# Patient Record
Sex: FEMALE | Race: WHITE | Employment: UNEMPLOYED | URBAN - METROPOLITAN AREA
[De-identification: names, ages, dates, MRNs, and addresses within clinical notes are randomized per-mention and may not be internally consistent; named-entity substitution may affect disease eponyms.]

---

## 2018-05-22 ENCOUNTER — TELEPHONE (OUTPATIENT)
Dept: INTERNAL MEDICINE | Age: 33
End: 2018-05-22

## 2018-06-01 ENCOUNTER — ANTI-COAG VISIT (OUTPATIENT)
Dept: INTERNAL MEDICINE | Age: 33
End: 2018-06-01

## 2019-01-24 PROBLEM — R11.0 NAUSEA: Status: ACTIVE | Noted: 2019-01-24

## 2019-02-05 PROBLEM — Z87.898 HISTORY OF INTRAVENOUS DRUG USE IN REMISSION: Status: ACTIVE | Noted: 2019-02-05

## 2019-02-05 PROBLEM — Z79.01 ANTICOAGULATED ON COUMADIN: Status: ACTIVE | Noted: 2019-02-05

## 2019-02-05 PROBLEM — F19.90 SUBSTANCE USE DISORDER: Status: ACTIVE | Noted: 2019-02-05

## 2019-02-05 PROBLEM — Z91.148 H/O MEDICATION NONCOMPLIANCE: Status: ACTIVE | Noted: 2019-02-05

## 2019-02-05 PROBLEM — Z91.14 H/O MEDICATION NONCOMPLIANCE: Status: ACTIVE | Noted: 2019-02-05

## 2019-02-05 PROBLEM — A54.9 NEISSERIA GONORRHEAE: Status: ACTIVE | Noted: 2019-02-05

## 2019-02-05 PROBLEM — Z72.51 UNPROTECTED SEXUAL INTERCOURSE: Status: ACTIVE | Noted: 2019-02-05

## 2019-02-05 PROBLEM — F19.91 HISTORY OF INTRAVENOUS DRUG USE IN REMISSION: Status: ACTIVE | Noted: 2019-02-05

## 2019-02-05 PROBLEM — B18.2 CHRONIC HEPATITIS C WITHOUT HEPATIC COMA (HCC): Status: ACTIVE | Noted: 2019-02-05

## 2019-02-05 PROBLEM — N76.0 BACTERIAL VAGINOSIS: Status: ACTIVE | Noted: 2019-02-05

## 2019-02-05 PROBLEM — B37.31 VAGINAL CANDIDIASIS: Status: ACTIVE | Noted: 2019-02-05

## 2019-02-05 PROBLEM — B96.89 BACTERIAL VAGINOSIS: Status: ACTIVE | Noted: 2019-02-05

## 2019-04-13 PROBLEM — Z72.51 UNPROTECTED SEXUAL INTERCOURSE: Status: RESOLVED | Noted: 2019-02-05 | Resolved: 2019-04-13

## 2019-04-13 PROBLEM — Z86.19 H/O GONORRHEA: Status: ACTIVE | Noted: 2019-04-13

## 2019-04-13 PROBLEM — B37.31 VAGINAL CANDIDIASIS: Status: RESOLVED | Noted: 2019-02-05 | Resolved: 2019-04-13

## 2019-04-13 PROBLEM — Z87.81: Status: ACTIVE | Noted: 2019-04-13

## 2019-05-07 PROBLEM — R74.8 ELEVATED LIVER ENZYMES: Status: ACTIVE | Noted: 2019-05-07

## 2019-06-16 PROBLEM — B15.9 VIRAL HEPATITIS A WITHOUT HEPATIC COMA: Status: ACTIVE | Noted: 2019-06-16

## 2019-06-29 PROBLEM — K64.5 THROMBOSED HEMORRHOIDS: Status: ACTIVE | Noted: 2019-06-29

## 2019-10-14 PROBLEM — R74.8 ELEVATED LIVER ENZYMES: Status: RESOLVED | Noted: 2019-05-07 | Resolved: 2019-10-14

## 2019-10-14 PROBLEM — R09.81 NASAL CONGESTION: Status: ACTIVE | Noted: 2019-10-14

## 2020-02-03 PROBLEM — S06.9XAA TRAUMATIC BRAIN INJURY: Status: ACTIVE | Noted: 2020-02-03

## 2020-02-03 PROBLEM — B15.9 VIRAL HEPATITIS A WITHOUT HEPATIC COMA: Status: RESOLVED | Noted: 2019-06-16 | Resolved: 2020-02-03

## 2020-02-03 PROBLEM — F12.90 MARIJUANA USE: Status: ACTIVE | Noted: 2020-02-03

## 2020-02-03 PROBLEM — R09.81 NASAL CONGESTION: Status: RESOLVED | Noted: 2019-10-14 | Resolved: 2020-02-03

## 2020-02-03 PROBLEM — A54.9 NEISSERIA GONORRHEAE: Status: RESOLVED | Noted: 2019-02-05 | Resolved: 2020-02-03

## 2020-02-03 PROBLEM — K64.5 THROMBOSED HEMORRHOIDS: Status: RESOLVED | Noted: 2019-06-29 | Resolved: 2020-02-03

## 2020-05-26 PROBLEM — K64.9 HEMORRHOIDS: Status: ACTIVE | Noted: 2020-05-26

## 2020-06-01 PROBLEM — K64.9 HEMORRHOIDS: Status: RESOLVED | Noted: 2020-05-26 | Resolved: 2020-06-01

## 2020-06-01 PROBLEM — Z98.890 H/O HEMORRHOIDECTOMY: Status: ACTIVE | Noted: 2020-06-01

## 2020-06-01 PROBLEM — K64.5 THROMBOSED EXTERNAL HEMORRHOID: Status: RESOLVED | Noted: 2019-06-29 | Resolved: 2020-06-01

## 2020-06-29 PROBLEM — K59.09 OTHER CONSTIPATION: Status: ACTIVE | Noted: 2020-06-29

## 2020-06-30 PROBLEM — L65.9 HAIR THINNING: Status: ACTIVE | Noted: 2020-06-30

## 2020-06-30 PROBLEM — R60.0 LOCALIZED EDEMA: Status: ACTIVE | Noted: 2020-06-30

## 2020-08-12 PROBLEM — Z86.19 HISTORY OF HEPATITIS A: Status: ACTIVE | Noted: 2020-08-12

## 2021-03-10 PROBLEM — K21.9 GASTROESOPHAGEAL REFLUX DISEASE WITHOUT ESOPHAGITIS: Status: ACTIVE | Noted: 2021-03-10

## 2021-03-11 PROBLEM — F43.0 ACUTE CRISIS REACTION: Status: ACTIVE | Noted: 2021-03-11

## 2022-06-06 ENCOUNTER — APPOINTMENT (OUTPATIENT)
Dept: GENERAL RADIOLOGY | Age: 37
End: 2022-06-06
Payer: COMMERCIAL

## 2022-06-06 ENCOUNTER — HOSPITAL ENCOUNTER (EMERGENCY)
Age: 37
Discharge: HOME | End: 2022-06-06
Attending: EMERGENCY MEDICINE
Payer: COMMERCIAL

## 2022-06-06 ENCOUNTER — APPOINTMENT (OUTPATIENT)
Dept: ULTRASOUND IMAGING | Age: 37
End: 2022-06-06
Payer: COMMERCIAL

## 2022-06-06 VITALS
RESPIRATION RATE: 16 BRPM | BODY MASS INDEX: 30.43 KG/M2 | HEIGHT: 60 IN | SYSTOLIC BLOOD PRESSURE: 120 MMHG | DIASTOLIC BLOOD PRESSURE: 84 MMHG | TEMPERATURE: 98.1 F | WEIGHT: 155 LBS | HEART RATE: 90 BPM | OXYGEN SATURATION: 99 %

## 2022-06-06 DIAGNOSIS — S93.402A SPRAIN OF LEFT ANKLE, UNSPECIFIED LIGAMENT, INITIAL ENCOUNTER: Primary | ICD-10-CM

## 2022-06-06 PROCEDURE — 99284 EMERGENCY DEPT VISIT MOD MDM: CPT

## 2022-06-06 PROCEDURE — 73610 X-RAY EXAM OF ANKLE: CPT

## 2022-06-06 PROCEDURE — 93971 EXTREMITY STUDY: CPT

## 2022-06-06 RX ORDER — CHOLECALCIFEROL (VITAMIN D3) 1250 MCG
CAPSULE ORAL
COMMUNITY
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
## 2022-06-06 NOTE — ED NOTES
Ace bandage applied.   Patient discharged to home states understanding of home instructions      Hever Muhammad RN  06/06/22 1466

## 2022-07-14 PROBLEM — F31.81 BIPOLAR 2 DISORDER (HCC): Status: ACTIVE | Noted: 2022-07-14

## 2022-09-29 ENCOUNTER — OFFICE VISIT (OUTPATIENT)
Dept: PRIMARY CARE CLINIC | Age: 37
End: 2022-09-29
Payer: COMMERCIAL

## 2022-09-29 VITALS — DIASTOLIC BLOOD PRESSURE: 71 MMHG | HEART RATE: 76 BPM | SYSTOLIC BLOOD PRESSURE: 101 MMHG | TEMPERATURE: 97.7 F

## 2022-09-29 DIAGNOSIS — J01.90 ACUTE BACTERIAL SINUSITIS: Primary | ICD-10-CM

## 2022-09-29 DIAGNOSIS — B96.89 ACUTE BACTERIAL SINUSITIS: Primary | ICD-10-CM

## 2022-09-29 LAB
Lab: NORMAL
PERFORMING INSTRUMENT: NORMAL
QC PASS/FAIL: NORMAL
SARS-COV-2, POC: NORMAL

## 2022-09-29 PROCEDURE — G8427 DOCREV CUR MEDS BY ELIG CLIN: HCPCS | Performed by: NURSE PRACTITIONER

## 2022-09-29 PROCEDURE — 99213 OFFICE O/P EST LOW 20 MIN: CPT | Performed by: NURSE PRACTITIONER

## 2022-09-29 PROCEDURE — 87426 SARSCOV CORONAVIRUS AG IA: CPT | Performed by: NURSE PRACTITIONER

## 2022-09-29 PROCEDURE — 4004F PT TOBACCO SCREEN RCVD TLK: CPT | Performed by: NURSE PRACTITIONER

## 2022-09-29 PROCEDURE — G8417 CALC BMI ABV UP PARAM F/U: HCPCS | Performed by: NURSE PRACTITIONER

## 2022-09-29 RX ORDER — AZELASTINE 1 MG/ML
1 SPRAY, METERED NASAL 2 TIMES DAILY
Qty: 30 ML | Refills: 0 | Status: SHIPPED | OUTPATIENT
Start: 2022-09-29

## 2022-09-29 RX ORDER — DOXYCYCLINE HYCLATE 100 MG
100 TABLET ORAL 2 TIMES DAILY
Qty: 20 TABLET | Refills: 0 | Status: SHIPPED | OUTPATIENT
Start: 2022-09-29 | End: 2022-10-09
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
## 2022-10-17 ENCOUNTER — TELEPHONE (OUTPATIENT)
Dept: ORTHOPEDIC SURGERY | Age: 37
End: 2022-10-17

## 2022-10-17 NOTE — TELEPHONE ENCOUNTER
Patient last saw Dr Adonis Stone 2017. She is asking to schedule with him for her left ankle pain. Please contact patient to schedule as no soon appointments are available to offer.

## 2022-10-20 NOTE — TELEPHONE ENCOUNTER
Call to pt she will get a referral from her PCP and have them fax to our office. She already contacted [facility] to fax CT report . She will request a CD.
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
## 2022-10-20 NOTE — TELEPHONE ENCOUNTER
We will need a referral. Can she inform us who ordered CT. Please have them fax CT results and will need imaging on CD. Will need to review with TTS regarding follow up.   Electronically signed by Ger Kaur PA-C on 10/20/2022 at 1:14 PM

## 2022-10-21 NOTE — TELEPHONE ENCOUNTER
Call to pt lvm per provider appt scheduled   Future Appointments   Date Time Provider Garland Pinto   12/7/2022  9:00 AM oJe Russo MD [facility]

## 2022-10-21 NOTE — TELEPHONE ENCOUNTER
Reviewed with TTS, ok to see next available.   Electronically signed by Iraida Li PA-C on 10/21/2022 at 9:47 AM

## 2022-11-06 ENCOUNTER — APPOINTMENT (OUTPATIENT)
Dept: GENERAL RADIOLOGY | Age: 37
End: 2022-11-06
Payer: COMMERCIAL

## 2022-11-06 ENCOUNTER — HOSPITAL ENCOUNTER (EMERGENCY)
Age: 37
Discharge: HOME | End: 2022-11-06
Payer: COMMERCIAL

## 2022-11-06 VITALS
WEIGHT: 150 LBS | SYSTOLIC BLOOD PRESSURE: 123 MMHG | DIASTOLIC BLOOD PRESSURE: 88 MMHG | RESPIRATION RATE: 16 BRPM | HEIGHT: 60 IN | BODY MASS INDEX: 29.45 KG/M2 | TEMPERATURE: 98.1 F | OXYGEN SATURATION: 100 % | HEART RATE: 63 BPM

## 2022-11-06 DIAGNOSIS — M79.672 LEFT FOOT PAIN: Primary | ICD-10-CM

## 2022-11-06 PROCEDURE — 99283 EMERGENCY DEPT VISIT LOW MDM: CPT

## 2022-11-06 PROCEDURE — 73610 X-RAY EXAM OF ANKLE: CPT

## 2022-11-06 PROCEDURE — 6370000000 HC RX 637 (ALT 250 FOR IP): Performed by: NURSE PRACTITIONER

## 2022-11-06 PROCEDURE — 73630 X-RAY EXAM OF FOOT: CPT

## 2022-11-06 RX ORDER — ACETAMINOPHEN 325 MG/1
650 TABLET ORAL ONCE
Status: COMPLETED | OUTPATIENT
Start: 2022-11-06 | End: 2022-11-06

## 2022-11-06 RX ADMIN — ACETAMINOPHEN 650 MG: 325 TABLET ORAL at 14:26
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
## 2022-11-08 NOTE — TELEPHONE ENCOUNTER
Patient was seen at ED on 11/6 for left foot pain and advised she needs to follow up with Dr. James Garcia sooner than her scheduled appointment . Please advise for ED follow up.

## 2022-11-08 NOTE — TELEPHONE ENCOUNTER
11/6/2022 ED TTS on call  Chief Complaint:  Foot Pain (Left foot pain, unsure of injury. Been on feet a lot more than normal. Hx of titanium all throughout legs. )  Imaging: All Radiology results interpreted by Radiologist unless otherwise noted. XR FOOT LEFT (MIN 3 VIEWS)   Final Result   1. Extensive chronic posttraumatic and postsurgical changes to the distal   left tibia and fibula. No obvious complications. 2.  Persistent os effect fragments posterior to the distal left tibia and   adjacent to the distal left fibula. Lateral malleolar soft tissue swelling. 3.  Tibiotalar joint degenerative changes. 4.  Tiny anterior calcaneal enthesophyte. Remaining imaged left foot is   unremarkable. Normal overall alignment. Slight disuse osteopenia may be   present. RECOMMENDATION:   In the setting of recent trauma, if there is persistent symptoms and physical   exam warrants a repeat radiograph in 10-14 days could be considered as occult   fractures may not be evident on initial imaging evaluation. XR ANKLE LEFT (MIN 3 VIEWS)   Final Result   1. Extensive chronic posttraumatic and postsurgical changes to the distal   left tibia and fibula. No obvious complications. 2.  Persistent os effect fragments posterior to the distal left tibia and   adjacent to the distal left fibula. Lateral malleolar soft tissue swelling. 3.  Tibiotalar joint degenerative changes. 4.  Tiny anterior calcaneal enthesophyte. Remaining imaged left foot is   unremarkable. Normal overall alignment. Slight disuse osteopenia may be   present. RECOMMENDATION:   In the setting of recent trauma, if there is persistent symptoms and physical   exam warrants a repeat radiograph in 10-14 days could be considered as occult   fractures may not be evident on initial imaging evaluation. Routing to provider for scheduling rec.

## 2022-11-08 NOTE — TELEPHONE ENCOUNTER
Call to pt advised: Please place patient on cancellation list for sooner appt. Patients imaging reviewed, no new fracture appreciated, compared to xrays completed in June. Pt verbalized understanding and is in agreement with the plan.

## 2022-11-08 NOTE — TELEPHONE ENCOUNTER
Please place patient on cancellation list for sooner appt. Patients imaging reviewed, no new fracture appreciated, compared to xrays completed in June.  Reviewed with TTS  Electronically signed by Iraida Li PA-C on 11/8/2022 at 12:57 PM

## 2022-12-02 DIAGNOSIS — M25.572 LEFT ANKLE PAIN, UNSPECIFIED CHRONICITY: Primary | ICD-10-CM

## 2022-12-07 ENCOUNTER — OFFICE VISIT (OUTPATIENT)
Dept: ORTHOPEDIC SURGERY | Age: 37
End: 2022-12-07
Payer: COMMERCIAL

## 2022-12-07 VITALS — HEIGHT: 60 IN | BODY MASS INDEX: 29.45 KG/M2 | WEIGHT: 150 LBS

## 2022-12-07 DIAGNOSIS — M25.572 LEFT ANKLE PAIN, UNSPECIFIED CHRONICITY: Primary | ICD-10-CM

## 2022-12-07 PROCEDURE — 99202 OFFICE O/P NEW SF 15 MIN: CPT

## 2022-12-07 PROCEDURE — 99203 OFFICE O/P NEW LOW 30 MIN: CPT | Performed by: ORTHOPAEDIC SURGERY

## 2022-12-07 PROCEDURE — G8484 FLU IMMUNIZE NO ADMIN: HCPCS | Performed by: ORTHOPAEDIC SURGERY

## 2022-12-07 PROCEDURE — 4004F PT TOBACCO SCREEN RCVD TLK: CPT | Performed by: ORTHOPAEDIC SURGERY

## 2022-12-07 PROCEDURE — G8427 DOCREV CUR MEDS BY ELIG CLIN: HCPCS | Performed by: ORTHOPAEDIC SURGERY

## 2022-12-07 PROCEDURE — G8417 CALC BMI ABV UP PARAM F/U: HCPCS | Performed by: ORTHOPAEDIC SURGERY
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
## 2022-12-13 RX ORDER — LIDOCAINE 4 G/G
1 PATCH TOPICAL DAILY
Qty: 30 PATCH | Refills: 0 | Status: SHIPPED | OUTPATIENT
Start: 2022-12-13 | End: 2023-01-12

## 2022-12-13 NOTE — TELEPHONE ENCOUNTER
Patient message requesting to have lidocaine patches ordered for ankle. Patient states that she has been buying them OTC but she states insurance should cover. LOV: 12/7/22    No future appointments.     Pend and routed

## 2023-01-06 ENCOUNTER — OFFICE VISIT (OUTPATIENT)
Dept: PRIMARY CARE CLINIC | Age: 38
End: 2023-01-06
Payer: COMMERCIAL

## 2023-01-06 VITALS
RESPIRATION RATE: 19 BRPM | OXYGEN SATURATION: 96 % | WEIGHT: 155 LBS | DIASTOLIC BLOOD PRESSURE: 67 MMHG | BODY MASS INDEX: 30.43 KG/M2 | HEART RATE: 64 BPM | HEIGHT: 60 IN | SYSTOLIC BLOOD PRESSURE: 96 MMHG | TEMPERATURE: 98 F

## 2023-01-06 DIAGNOSIS — B96.89 ACUTE BACTERIAL SINUSITIS: ICD-10-CM

## 2023-01-06 DIAGNOSIS — R05.9 COUGH, UNSPECIFIED TYPE: ICD-10-CM

## 2023-01-06 DIAGNOSIS — J01.90 ACUTE BACTERIAL SINUSITIS: ICD-10-CM

## 2023-01-06 LAB
INFLUENZA A ANTIBODY: NEGATIVE
INFLUENZA B ANTIBODY: NEGATIVE
Lab: NORMAL
PERFORMING INSTRUMENT: NORMAL
QC PASS/FAIL: NORMAL
SARS-COV-2, POC: NORMAL

## 2023-01-06 PROCEDURE — G8417 CALC BMI ABV UP PARAM F/U: HCPCS | Performed by: NURSE PRACTITIONER

## 2023-01-06 PROCEDURE — G8427 DOCREV CUR MEDS BY ELIG CLIN: HCPCS | Performed by: NURSE PRACTITIONER

## 2023-01-06 PROCEDURE — 99213 OFFICE O/P EST LOW 20 MIN: CPT | Performed by: NURSE PRACTITIONER

## 2023-01-06 PROCEDURE — 87426 SARSCOV CORONAVIRUS AG IA: CPT | Performed by: NURSE PRACTITIONER

## 2023-01-06 PROCEDURE — 4004F PT TOBACCO SCREEN RCVD TLK: CPT | Performed by: NURSE PRACTITIONER

## 2023-01-06 PROCEDURE — G8484 FLU IMMUNIZE NO ADMIN: HCPCS | Performed by: NURSE PRACTITIONER

## 2023-01-06 PROCEDURE — 87804 INFLUENZA ASSAY W/OPTIC: CPT | Performed by: NURSE PRACTITIONER

## 2023-01-06 RX ORDER — CEFDINIR 300 MG/1
300 CAPSULE ORAL 2 TIMES DAILY
Qty: 20 CAPSULE | Refills: 0 | Status: SHIPPED
Start: 2023-01-06 | End: 2023-01-10
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
## 2023-01-10 ENCOUNTER — TELEPHONE (OUTPATIENT)
Dept: FAMILY MEDICINE CLINIC | Age: 38
End: 2023-01-10

## 2023-01-10 DIAGNOSIS — J01.90 ACUTE BACTERIAL SINUSITIS: Primary | ICD-10-CM

## 2023-01-10 DIAGNOSIS — B96.89 ACUTE BACTERIAL SINUSITIS: Primary | ICD-10-CM

## 2023-01-10 RX ORDER — BENZONATATE 100 MG/1
100 CAPSULE ORAL 3 TIMES DAILY PRN
Qty: 30 CAPSULE | Refills: 0 | Status: SHIPPED | OUTPATIENT
Start: 2023-01-10 | End: 2023-01-17

## 2023-01-10 RX ORDER — AZITHROMYCIN 250 MG/1
250 TABLET, FILM COATED ORAL SEE ADMIN INSTRUCTIONS
Qty: 6 TABLET | Refills: 0 | Status: SHIPPED | OUTPATIENT
Start: 2023-01-10 | End: 2023-01-15
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
## 2023-01-10 NOTE — TELEPHONE ENCOUNTER
Pt notified and voiced understanding. Pt also wants to know if it's possible for serge to call in tesslon pearls for cough.  Please advise

## 2023-11-17 ENCOUNTER — HOSPITAL ENCOUNTER (EMERGENCY)
Age: 38
Discharge: HOME | End: 2023-11-18
Attending: EMERGENCY MEDICINE
Payer: COMMERCIAL

## 2023-11-17 VITALS
BODY MASS INDEX: 29.29 KG/M2 | WEIGHT: 150 LBS | RESPIRATION RATE: 18 BRPM | DIASTOLIC BLOOD PRESSURE: 80 MMHG | SYSTOLIC BLOOD PRESSURE: 115 MMHG | HEART RATE: 102 BPM | OXYGEN SATURATION: 98 % | TEMPERATURE: 98.4 F

## 2023-11-17 DIAGNOSIS — L25.9 CONTACT DERMATITIS, UNSPECIFIED CONTACT DERMATITIS TYPE, UNSPECIFIED TRIGGER: Primary | ICD-10-CM

## 2023-11-17 DIAGNOSIS — L03.116 CELLULITIS OF LEFT THIGH: ICD-10-CM

## 2023-11-17 PROCEDURE — 99283 EMERGENCY DEPT VISIT LOW MDM: CPT

## 2023-11-18 PROCEDURE — 6370000000 HC RX 637 (ALT 250 FOR IP): Performed by: EMERGENCY MEDICINE

## 2023-11-18 RX ORDER — CEPHALEXIN 500 MG/1
500 CAPSULE ORAL 3 TIMES DAILY
Qty: 21 CAPSULE | Refills: 0 | Status: SHIPPED | OUTPATIENT
Start: 2023-11-18 | End: 2023-11-25

## 2023-11-18 RX ORDER — DIPHENHYDRAMINE HCL 25 MG
50 TABLET ORAL ONCE
Status: COMPLETED | OUTPATIENT
Start: 2023-11-18 | End: 2023-11-18

## 2023-11-18 RX ORDER — TRIAMCINOLONE ACETONIDE 5 MG/G
CREAM TOPICAL
Qty: 45 G | Refills: 0 | Status: SHIPPED | OUTPATIENT
Start: 2023-11-18

## 2023-11-18 RX ORDER — CETIRIZINE HYDROCHLORIDE 5 MG/1
10 TABLET ORAL DAILY PRN
Qty: 10 TABLET | Refills: 1 | Status: SHIPPED | OUTPATIENT
Start: 2023-11-18

## 2023-11-18 RX ORDER — CEPHALEXIN 500 MG/1
500 CAPSULE ORAL ONCE
Status: COMPLETED | OUTPATIENT
Start: 2023-11-18 | End: 2023-11-18

## 2023-11-18 RX ADMIN — DIPHENHYDRAMINE HYDROCHLORIDE 50 MG: 25 TABLET ORAL at 01:29

## 2023-11-18 RX ADMIN — CEPHALEXIN 500 MG: 500 CAPSULE ORAL at 01:29

## 2023-11-18 NOTE — ED NOTES
Discharge instructions reviewed , including diagnosis, medications, follow up appointments, home care, and also when to call 911. All discharge instructions questions answered.          Pt left ED ambulatory       Ray Staff, RN  11/18/23 [id number]

## 2024-01-08 ENCOUNTER — OFFICE VISIT (OUTPATIENT)
Dept: PRIMARY CARE CLINIC | Age: 39
End: 2024-01-08
Payer: COMMERCIAL

## 2024-01-08 VITALS
WEIGHT: 143 LBS | BODY MASS INDEX: 28.07 KG/M2 | DIASTOLIC BLOOD PRESSURE: 73 MMHG | OXYGEN SATURATION: 98 % | HEART RATE: 86 BPM | TEMPERATURE: 97 F | HEIGHT: 60 IN | SYSTOLIC BLOOD PRESSURE: 109 MMHG | RESPIRATION RATE: 19 BRPM

## 2024-01-08 DIAGNOSIS — B49 FUNGAL INFECTION: ICD-10-CM

## 2024-01-08 DIAGNOSIS — J06.9 ACUTE URI: ICD-10-CM

## 2024-01-08 DIAGNOSIS — R05.9 COUGH IN ADULT PATIENT: ICD-10-CM

## 2024-01-08 LAB — S PYO AG THROAT QL: ABNORMAL

## 2024-01-08 PROCEDURE — 4004F PT TOBACCO SCREEN RCVD TLK: CPT | Performed by: NURSE PRACTITIONER

## 2024-01-08 PROCEDURE — 99213 OFFICE O/P EST LOW 20 MIN: CPT | Performed by: NURSE PRACTITIONER

## 2024-01-08 PROCEDURE — 87880 STREP A ASSAY W/OPTIC: CPT | Performed by: NURSE PRACTITIONER

## 2024-01-08 PROCEDURE — G8484 FLU IMMUNIZE NO ADMIN: HCPCS | Performed by: NURSE PRACTITIONER

## 2024-01-08 PROCEDURE — G8419 CALC BMI OUT NRM PARAM NOF/U: HCPCS | Performed by: NURSE PRACTITIONER

## 2024-01-08 PROCEDURE — G8427 DOCREV CUR MEDS BY ELIG CLIN: HCPCS | Performed by: NURSE PRACTITIONER

## 2024-01-08 RX ORDER — DOXYCYCLINE HYCLATE 100 MG
100 TABLET ORAL 2 TIMES DAILY
Qty: 20 TABLET | Refills: 0 | Status: SHIPPED | OUTPATIENT
Start: 2024-01-08 | End: 2024-01-18

## 2024-01-08 RX ORDER — PREDNISONE 10 MG/1
10 TABLET ORAL 2 TIMES DAILY
Qty: 10 TABLET | Refills: 0 | Status: SHIPPED | OUTPATIENT
Start: 2024-01-08 | End: 2024-01-13

## 2024-01-08 RX ORDER — NYSTATIN 100000 [USP'U]/G
POWDER TOPICAL
Qty: 15 G | Refills: 0 | Status: SHIPPED | OUTPATIENT
Start: 2024-01-08

## 2024-01-08 NOTE — PROGRESS NOTES
rubs.  Skin:  Normal turgor.  Warm and dry. Moderate redness to umbilical region. Skin intact, no drainage.  Neurological:  Oriented.  Motor functions intact.       Lab / Imaging Results   (All laboratory and radiology results have been personally reviewed by myself)  Labs:  Results for orders placed or performed in visit on 01/08/24   POCT rapid strep A   Result Value Ref Range    Strep A Ag None Detected None Detected       Imaging:  All Radiology results interpreted by Radiologist unless otherwise noted.  No orders to display         Assessment / Plan     Impression(s):  1. Acute URI    2. Cough in adult patient    3. Fungal infection      Disposition:  Disposition: Advised Strep test is negative. Start Doxycycline  and Prednisone. Stay well hydrated. Apply Nystatin powder to umbilical region, keep skin clean and dry. Advised if no improvement, contact PCP for further evaluation/treatment

## 2024-01-18 ENCOUNTER — OFFICE VISIT (OUTPATIENT)
Dept: FAMILY MEDICINE CLINIC | Age: 39
End: 2024-01-18
Payer: COMMERCIAL

## 2024-01-18 VITALS
HEIGHT: 60 IN | BODY MASS INDEX: 28.78 KG/M2 | SYSTOLIC BLOOD PRESSURE: 105 MMHG | TEMPERATURE: 97.6 F | HEART RATE: 94 BPM | WEIGHT: 146.6 LBS | DIASTOLIC BLOOD PRESSURE: 73 MMHG | RESPIRATION RATE: 18 BRPM | OXYGEN SATURATION: 99 %

## 2024-01-18 DIAGNOSIS — G89.21 CHRONIC PAIN DUE TO TRAUMA: Chronic | ICD-10-CM

## 2024-01-18 DIAGNOSIS — F19.11 SUBSTANCE ABUSE IN REMISSION (HCC): Chronic | ICD-10-CM

## 2024-01-18 DIAGNOSIS — I47.10 PAROXYSMAL SVT (SUPRAVENTRICULAR TACHYCARDIA): Chronic | ICD-10-CM

## 2024-01-18 DIAGNOSIS — B49 FUNGAL INFECTION: ICD-10-CM

## 2024-01-18 DIAGNOSIS — S06.9X9S TRAUMATIC BRAIN INJURY WITH LOSS OF CONSCIOUSNESS, SEQUELA (HCC): Chronic | ICD-10-CM

## 2024-01-18 DIAGNOSIS — B18.2 CHRONIC HEPATITIS C WITHOUT HEPATIC COMA (HCC): Chronic | ICD-10-CM

## 2024-01-18 DIAGNOSIS — G43.709 CHRONIC MIGRAINE WITHOUT AURA WITHOUT STATUS MIGRAINOSUS, NOT INTRACTABLE: Chronic | ICD-10-CM

## 2024-01-18 DIAGNOSIS — D68.59 HYPERCOAGULABLE STATE (HCC): Primary | Chronic | ICD-10-CM

## 2024-01-18 PROBLEM — K59.09 OTHER CONSTIPATION: Status: RESOLVED | Noted: 2020-06-29 | Resolved: 2024-01-18

## 2024-01-18 PROBLEM — F43.0 ACUTE CRISIS REACTION: Status: RESOLVED | Noted: 2021-03-11 | Resolved: 2024-01-18

## 2024-01-18 PROBLEM — F31.81 BIPOLAR 2 DISORDER (HCC): Status: RESOLVED | Noted: 2022-07-14 | Resolved: 2024-01-18

## 2024-01-18 PROCEDURE — G8484 FLU IMMUNIZE NO ADMIN: HCPCS | Performed by: STUDENT IN AN ORGANIZED HEALTH CARE EDUCATION/TRAINING PROGRAM

## 2024-01-18 PROCEDURE — G8427 DOCREV CUR MEDS BY ELIG CLIN: HCPCS | Performed by: STUDENT IN AN ORGANIZED HEALTH CARE EDUCATION/TRAINING PROGRAM

## 2024-01-18 PROCEDURE — 4004F PT TOBACCO SCREEN RCVD TLK: CPT | Performed by: STUDENT IN AN ORGANIZED HEALTH CARE EDUCATION/TRAINING PROGRAM

## 2024-01-18 PROCEDURE — G8419 CALC BMI OUT NRM PARAM NOF/U: HCPCS | Performed by: STUDENT IN AN ORGANIZED HEALTH CARE EDUCATION/TRAINING PROGRAM

## 2024-01-18 PROCEDURE — 99204 OFFICE O/P NEW MOD 45 MIN: CPT | Performed by: STUDENT IN AN ORGANIZED HEALTH CARE EDUCATION/TRAINING PROGRAM

## 2024-01-18 SDOH — ECONOMIC STABILITY: FOOD INSECURITY: WITHIN THE PAST 12 MONTHS, YOU WORRIED THAT YOUR FOOD WOULD RUN OUT BEFORE YOU GOT MONEY TO BUY MORE.: NEVER TRUE

## 2024-01-18 SDOH — ECONOMIC STABILITY: HOUSING INSECURITY
IN THE LAST 12 MONTHS, WAS THERE A TIME WHEN YOU DID NOT HAVE A STEADY PLACE TO SLEEP OR SLEPT IN A SHELTER (INCLUDING NOW)?: NO

## 2024-01-18 SDOH — ECONOMIC STABILITY: INCOME INSECURITY: HOW HARD IS IT FOR YOU TO PAY FOR THE VERY BASICS LIKE FOOD, HOUSING, MEDICAL CARE, AND HEATING?: NOT HARD AT ALL

## 2024-01-18 SDOH — ECONOMIC STABILITY: FOOD INSECURITY: WITHIN THE PAST 12 MONTHS, THE FOOD YOU BOUGHT JUST DIDN'T LAST AND YOU DIDN'T HAVE MONEY TO GET MORE.: NEVER TRUE

## 2024-01-18 ASSESSMENT — PATIENT HEALTH QUESTIONNAIRE - PHQ9
SUM OF ALL RESPONSES TO PHQ QUESTIONS 1-9: 3
6. FEELING BAD ABOUT YOURSELF - OR THAT YOU ARE A FAILURE OR HAVE LET YOURSELF OR YOUR FAMILY DOWN: 0
SUM OF ALL RESPONSES TO PHQ QUESTIONS 1-9: 3
SUM OF ALL RESPONSES TO PHQ9 QUESTIONS 1 & 2: 0
10. IF YOU CHECKED OFF ANY PROBLEMS, HOW DIFFICULT HAVE THESE PROBLEMS MADE IT FOR YOU TO DO YOUR WORK, TAKE CARE OF THINGS AT HOME, OR GET ALONG WITH OTHER PEOPLE: 0
SUM OF ALL RESPONSES TO PHQ QUESTIONS 1-9: 3
3. TROUBLE FALLING OR STAYING ASLEEP: 0
5. POOR APPETITE OR OVEREATING: 0
2. FEELING DOWN, DEPRESSED OR HOPELESS: 0
9. THOUGHTS THAT YOU WOULD BE BETTER OFF DEAD, OR OF HURTING YOURSELF: 0
1. LITTLE INTEREST OR PLEASURE IN DOING THINGS: 0
4. FEELING TIRED OR HAVING LITTLE ENERGY: 2
SUM OF ALL RESPONSES TO PHQ QUESTIONS 1-9: 3
8. MOVING OR SPEAKING SO SLOWLY THAT OTHER PEOPLE COULD HAVE NOTICED. OR THE OPPOSITE, BEING SO FIGETY OR RESTLESS THAT YOU HAVE BEEN MOVING AROUND A LOT MORE THAN USUAL: 0
7. TROUBLE CONCENTRATING ON THINGS, SUCH AS READING THE NEWSPAPER OR WATCHING TELEVISION: 1
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
## 2024-02-19 ENCOUNTER — HOSPITAL ENCOUNTER (OUTPATIENT)
Age: 39
Discharge: HOME | End: 2024-02-19
Payer: COMMERCIAL

## 2024-02-19 ENCOUNTER — INITIAL CONSULT (OUTPATIENT)
Age: 39
End: 2024-02-19
Payer: COMMERCIAL

## 2024-02-19 VITALS
OXYGEN SATURATION: 99 % | HEART RATE: 74 BPM | RESPIRATION RATE: 16 BRPM | BODY MASS INDEX: 28.8 KG/M2 | HEIGHT: 60 IN | SYSTOLIC BLOOD PRESSURE: 106 MMHG | DIASTOLIC BLOOD PRESSURE: 60 MMHG | TEMPERATURE: 97.2 F | WEIGHT: 146.7 LBS

## 2024-02-19 DIAGNOSIS — B18.2 CHRONIC HEPATITIS C WITHOUT HEPATIC COMA (HCC): Primary | ICD-10-CM

## 2024-02-19 DIAGNOSIS — B18.2 CHRONIC HEPATITIS C WITHOUT HEPATIC COMA (HCC): ICD-10-CM

## 2024-02-19 LAB
ALBUMIN SERPL-MCNC: 4.2 G/DL (ref 3.5–5.2)
ALP SERPL-CCNC: 65 U/L (ref 35–104)
ALT SERPL-CCNC: 75 U/L (ref 0–32)
AST SERPL-CCNC: 55 U/L (ref 0–31)
BASOPHILS # BLD: 0.06 K/UL (ref 0–0.2)
BASOPHILS NFR BLD: 1 % (ref 0–2)
BILIRUB DIRECT SERPL-MCNC: <0.2 MG/DL (ref 0–0.3)
BILIRUB INDIRECT SERPL-MCNC: ABNORMAL MG/DL (ref 0–1)
BILIRUB SERPL-MCNC: 0.4 MG/DL (ref 0–1.2)
EOSINOPHIL # BLD: 0.33 K/UL (ref 0.05–0.5)
EOSINOPHILS RELATIVE PERCENT: 4 % (ref 0–6)
ERYTHROCYTE [DISTWIDTH] IN BLOOD BY AUTOMATED COUNT: 12.3 % (ref 11.5–15)
HCT VFR BLD AUTO: 42.6 % (ref 34–48)
HGB BLD-MCNC: 15.5 G/DL (ref 11.5–15.5)
IMM GRANULOCYTES # BLD AUTO: 0.04 K/UL (ref 0–0.58)
IMM GRANULOCYTES NFR BLD: 1 % (ref 0–5)
LYMPHOCYTES NFR BLD: 1.93 K/UL (ref 1.5–4)
LYMPHOCYTES RELATIVE PERCENT: 26 % (ref 20–42)
MCH RBC QN AUTO: 30.8 PG (ref 26–35)
MCHC RBC AUTO-ENTMCNC: 36.4 G/DL (ref 32–34.5)
MCV RBC AUTO: 84.5 FL (ref 80–99.9)
MONOCYTES NFR BLD: 0.41 K/UL (ref 0.1–0.95)
MONOCYTES NFR BLD: 6 % (ref 2–12)
NEUTROPHILS NFR BLD: 63 % (ref 43–80)
NEUTS SEG NFR BLD: 4.7 K/UL (ref 1.8–7.3)
PLATELET # BLD AUTO: 177 K/UL (ref 130–450)
PMV BLD AUTO: 9.3 FL (ref 7–12)
PROT SERPL-MCNC: 7.6 G/DL (ref 6.4–8.3)
RBC # BLD AUTO: 5.04 M/UL (ref 3.5–5.5)
WBC OTHER # BLD: 7.5 K/UL (ref 4.5–11.5)

## 2024-02-19 PROCEDURE — G8427 DOCREV CUR MEDS BY ELIG CLIN: HCPCS | Performed by: NURSE PRACTITIONER

## 2024-02-19 PROCEDURE — 85025 COMPLETE CBC W/AUTO DIFF WBC: CPT

## 2024-02-19 PROCEDURE — 86709 HEPATITIS A IGM ANTIBODY: CPT

## 2024-02-19 PROCEDURE — 86704 HEP B CORE ANTIBODY TOTAL: CPT

## 2024-02-19 PROCEDURE — 86708 HEPATITIS A ANTIBODY: CPT

## 2024-02-19 PROCEDURE — 86803 HEPATITIS C AB TEST: CPT

## 2024-02-19 PROCEDURE — 86317 IMMUNOASSAY INFECTIOUS AGENT: CPT

## 2024-02-19 PROCEDURE — 36415 COLL VENOUS BLD VENIPUNCTURE: CPT

## 2024-02-19 PROCEDURE — 87340 HEPATITIS B SURFACE AG IA: CPT

## 2024-02-19 PROCEDURE — 87389 HIV-1 AG W/HIV-1&-2 AB AG IA: CPT

## 2024-02-19 PROCEDURE — 1036F TOBACCO NON-USER: CPT | Performed by: NURSE PRACTITIONER

## 2024-02-19 PROCEDURE — G8484 FLU IMMUNIZE NO ADMIN: HCPCS | Performed by: NURSE PRACTITIONER

## 2024-02-19 PROCEDURE — G8419 CALC BMI OUT NRM PARAM NOF/U: HCPCS | Performed by: NURSE PRACTITIONER

## 2024-02-19 PROCEDURE — 99202 OFFICE O/P NEW SF 15 MIN: CPT | Performed by: NURSE PRACTITIONER

## 2024-02-19 PROCEDURE — 80076 HEPATIC FUNCTION PANEL: CPT
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
## 2024-02-20 LAB
HAV IGM SERPL QL IA: NONREACTIVE
HBV SURFACE AB SERPL IA-ACNC: >1000 MIU/ML (ref 0–9.99)
HBV SURFACE AG SERPL QL IA: NONREACTIVE
HCV AB SERPL QL IA: REACTIVE
HIV 1+2 AB+HIV1 P24 AG SERPL QL IA: NONREACTIVE

## 2024-02-21 LAB
HAV AB SERPL IA-ACNC: REACTIVE
HBV CORE AB SER QL: NONREACTIVE

## 2024-03-05 ENCOUNTER — HOSPITAL ENCOUNTER (OUTPATIENT)
Dept: ULTRASOUND IMAGING | Age: 39
Discharge: HOME | End: 2024-03-07
Payer: COMMERCIAL

## 2024-03-05 DIAGNOSIS — B18.2 CHRONIC HEPATITIS C WITHOUT HEPATIC COMA (HCC): ICD-10-CM

## 2024-03-05 PROCEDURE — 76705 ECHO EXAM OF ABDOMEN: CPT

## 2024-07-18 ENCOUNTER — OFFICE VISIT (OUTPATIENT)
Dept: FAMILY MEDICINE CLINIC | Age: 39
End: 2024-07-18
Payer: COMMERCIAL

## 2024-07-18 VITALS
BODY MASS INDEX: 28.23 KG/M2 | DIASTOLIC BLOOD PRESSURE: 76 MMHG | HEIGHT: 60 IN | OXYGEN SATURATION: 100 % | TEMPERATURE: 97.2 F | SYSTOLIC BLOOD PRESSURE: 111 MMHG | HEART RATE: 71 BPM | RESPIRATION RATE: 18 BRPM | WEIGHT: 143.8 LBS

## 2024-07-18 DIAGNOSIS — B18.2 CHRONIC HEPATITIS C WITHOUT HEPATIC COMA (HCC): Primary | ICD-10-CM

## 2024-07-18 DIAGNOSIS — L30.9 DERMATITIS: ICD-10-CM

## 2024-07-18 DIAGNOSIS — I47.10 PAROXYSMAL SVT (SUPRAVENTRICULAR TACHYCARDIA) (HCC): Chronic | ICD-10-CM

## 2024-07-18 PROCEDURE — G8419 CALC BMI OUT NRM PARAM NOF/U: HCPCS | Performed by: STUDENT IN AN ORGANIZED HEALTH CARE EDUCATION/TRAINING PROGRAM

## 2024-07-18 PROCEDURE — 1036F TOBACCO NON-USER: CPT | Performed by: STUDENT IN AN ORGANIZED HEALTH CARE EDUCATION/TRAINING PROGRAM

## 2024-07-18 PROCEDURE — 99214 OFFICE O/P EST MOD 30 MIN: CPT | Performed by: STUDENT IN AN ORGANIZED HEALTH CARE EDUCATION/TRAINING PROGRAM

## 2024-07-18 PROCEDURE — G8427 DOCREV CUR MEDS BY ELIG CLIN: HCPCS | Performed by: STUDENT IN AN ORGANIZED HEALTH CARE EDUCATION/TRAINING PROGRAM
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
## 2024-10-19 ENCOUNTER — HOSPITAL ENCOUNTER (EMERGENCY)
Age: 39
Discharge: HOME | End: 2024-10-19
Attending: EMERGENCY MEDICINE
Payer: COMMERCIAL

## 2024-10-19 ENCOUNTER — APPOINTMENT (OUTPATIENT)
Dept: GENERAL RADIOLOGY | Age: 39
End: 2024-10-19
Payer: COMMERCIAL

## 2024-10-19 VITALS
RESPIRATION RATE: 18 BRPM | DIASTOLIC BLOOD PRESSURE: 68 MMHG | SYSTOLIC BLOOD PRESSURE: 123 MMHG | BODY MASS INDEX: 28.07 KG/M2 | HEART RATE: 81 BPM | HEIGHT: 60 IN | WEIGHT: 143 LBS | OXYGEN SATURATION: 98 % | TEMPERATURE: 98.3 F

## 2024-10-19 DIAGNOSIS — R07.9 CHEST PAIN, UNSPECIFIED TYPE: Primary | ICD-10-CM

## 2024-10-19 LAB
ALBUMIN SERPL-MCNC: 4.4 G/DL (ref 3.5–5.2)
ALP SERPL-CCNC: 69 U/L (ref 35–104)
ALT SERPL-CCNC: 47 U/L (ref 0–32)
ANION GAP SERPL CALCULATED.3IONS-SCNC: 14 MMOL/L (ref 7–16)
AST SERPL-CCNC: 37 U/L (ref 0–31)
BASOPHILS # BLD: 0.06 K/UL (ref 0–0.2)
BASOPHILS NFR BLD: 1 % (ref 0–2)
BILIRUB SERPL-MCNC: 0.4 MG/DL (ref 0–1.2)
BUN SERPL-MCNC: 10 MG/DL (ref 6–20)
CALCIUM SERPL-MCNC: 9.5 MG/DL (ref 8.6–10.2)
CHLORIDE SERPL-SCNC: 103 MMOL/L (ref 98–107)
CO2 SERPL-SCNC: 21 MMOL/L (ref 22–29)
CREAT SERPL-MCNC: 0.7 MG/DL (ref 0.5–1)
D-DIMER QUANTITATIVE: <200 NG/ML DDU (ref 0–230)
EKG ATRIAL RATE: 85 BPM
EKG P AXIS: 57 DEGREES
EKG P-R INTERVAL: 126 MS
EKG Q-T INTERVAL: 368 MS
EKG QRS DURATION: 80 MS
EKG QTC CALCULATION (BAZETT): 437 MS
EKG R AXIS: 19 DEGREES
EKG T AXIS: 49 DEGREES
EKG VENTRICULAR RATE: 85 BPM
EOSINOPHIL # BLD: 0.26 K/UL (ref 0.05–0.5)
EOSINOPHILS RELATIVE PERCENT: 4 % (ref 0–6)
ERYTHROCYTE [DISTWIDTH] IN BLOOD BY AUTOMATED COUNT: 12 % (ref 11.5–15)
GFR, ESTIMATED: >90 ML/MIN/1.73M2
GLUCOSE SERPL-MCNC: 101 MG/DL (ref 74–99)
HCT VFR BLD AUTO: 42.4 % (ref 34–48)
HGB BLD-MCNC: 15 G/DL (ref 11.5–15.5)
IMM GRANULOCYTES # BLD AUTO: 0.04 K/UL (ref 0–0.58)
IMM GRANULOCYTES NFR BLD: 1 % (ref 0–5)
INR PPP: 1.1
LYMPHOCYTES NFR BLD: 1.99 K/UL (ref 1.5–4)
LYMPHOCYTES RELATIVE PERCENT: 28 % (ref 20–42)
MAGNESIUM SERPL-MCNC: 2.1 MG/DL (ref 1.6–2.6)
MCH RBC QN AUTO: 30.4 PG (ref 26–35)
MCHC RBC AUTO-ENTMCNC: 35.4 G/DL (ref 32–34.5)
MCV RBC AUTO: 85.8 FL (ref 80–99.9)
MONOCYTES NFR BLD: 0.37 K/UL (ref 0.1–0.95)
MONOCYTES NFR BLD: 5 % (ref 2–12)
NEUTROPHILS NFR BLD: 61 % (ref 43–80)
NEUTS SEG NFR BLD: 4.31 K/UL (ref 1.8–7.3)
PLATELET # BLD AUTO: 198 K/UL (ref 130–450)
PMV BLD AUTO: 9.8 FL (ref 7–12)
POTASSIUM SERPL-SCNC: 3.8 MMOL/L (ref 3.5–5)
PROT SERPL-MCNC: 7.7 G/DL (ref 6.4–8.3)
PROTHROMBIN TIME: 11.4 SEC (ref 9.3–12.4)
RBC # BLD AUTO: 4.94 M/UL (ref 3.5–5.5)
SODIUM SERPL-SCNC: 138 MMOL/L (ref 132–146)
TROPONIN I SERPL HS-MCNC: <6 NG/L (ref 0–9)
TROPONIN I SERPL HS-MCNC: <6 NG/L (ref 0–9)
WBC OTHER # BLD: 7 K/UL (ref 4.5–11.5)

## 2024-10-19 PROCEDURE — 84484 ASSAY OF TROPONIN QUANT: CPT

## 2024-10-19 PROCEDURE — 71046 X-RAY EXAM CHEST 2 VIEWS: CPT

## 2024-10-19 PROCEDURE — 85379 FIBRIN DEGRADATION QUANT: CPT

## 2024-10-19 PROCEDURE — 93005 ELECTROCARDIOGRAM TRACING: CPT | Performed by: EMERGENCY MEDICINE

## 2024-10-19 PROCEDURE — 99285 EMERGENCY DEPT VISIT HI MDM: CPT

## 2024-10-19 PROCEDURE — 80053 COMPREHEN METABOLIC PANEL: CPT

## 2024-10-19 PROCEDURE — 6360000002 HC RX W HCPCS: Performed by: EMERGENCY MEDICINE

## 2024-10-19 PROCEDURE — 85610 PROTHROMBIN TIME: CPT

## 2024-10-19 PROCEDURE — 96374 THER/PROPH/DIAG INJ IV PUSH: CPT

## 2024-10-19 PROCEDURE — 85025 COMPLETE CBC W/AUTO DIFF WBC: CPT

## 2024-10-19 PROCEDURE — 83735 ASSAY OF MAGNESIUM: CPT

## 2024-10-19 RX ORDER — DROPERIDOL 2.5 MG/ML
1.25 INJECTION, SOLUTION INTRAMUSCULAR; INTRAVENOUS ONCE
Status: COMPLETED | OUTPATIENT
Start: 2024-10-19 | End: 2024-10-19

## 2024-10-19 RX ORDER — ASPIRIN 81 MG/1
81 TABLET ORAL DAILY
COMMUNITY

## 2024-10-19 RX ADMIN — DROPERIDOL 1.25 MG: 2.5 INJECTION, SOLUTION INTRAMUSCULAR; INTRAVENOUS at 23:02

## 2024-10-19 ASSESSMENT — PAIN DESCRIPTION - PAIN TYPE: TYPE: ACUTE PAIN

## 2024-10-19 ASSESSMENT — PAIN DESCRIPTION - DESCRIPTORS: DESCRIPTORS: ACHING

## 2024-10-19 ASSESSMENT — PAIN - FUNCTIONAL ASSESSMENT
PAIN_FUNCTIONAL_ASSESSMENT: ACTIVITIES ARE NOT PREVENTED
PAIN_FUNCTIONAL_ASSESSMENT: 0-10

## 2024-10-19 ASSESSMENT — PAIN SCALES - GENERAL: PAINLEVEL_OUTOF10: 7

## 2024-10-19 ASSESSMENT — LIFESTYLE VARIABLES
HOW OFTEN DO YOU HAVE A DRINK CONTAINING ALCOHOL: NEVER
HOW MANY STANDARD DRINKS CONTAINING ALCOHOL DO YOU HAVE ON A TYPICAL DAY: PATIENT DOES NOT DRINK

## 2024-10-19 ASSESSMENT — PAIN DESCRIPTION - ONSET: ONSET: ON-GOING

## 2024-10-19 ASSESSMENT — PAIN DESCRIPTION - LOCATION: LOCATION: CHEST

## 2024-10-19 ASSESSMENT — PAIN DESCRIPTION - FREQUENCY: FREQUENCY: CONTINUOUS

## 2024-10-20 NOTE — ED PROVIDER NOTES
Potassium 3.8 3.5 - 5.0 mmol/L    Chloride 103 98 - 107 mmol/L    CO2 21 (L) 22 - 29 mmol/L    Anion Gap 14 7 - 16 mmol/L    Glucose 101 (H) 74 - 99 mg/dL    BUN 10 6 - 20 mg/dL    Creatinine 0.7 0.50 - 1.00 mg/dL    Est, Glom Filt Rate >90 >60 mL/min/1.73m2    Calcium 9.5 8.6 - 10.2 mg/dL    Total Protein 7.7 6.4 - 8.3 g/dL    Albumin 4.4 3.5 - 5.2 g/dL    Total Bilirubin 0.4 0.0 - 1.2 mg/dL    Alkaline Phosphatase 69 35 - 104 U/L    ALT 47 (H) 0 - 32 U/L    AST 37 (H) 0 - 31 U/L   CBC with Auto Differential   Result Value Ref Range    WBC 7.0 4.5 - 11.5 k/uL    RBC 4.94 3.50 - 5.50 m/uL    Hemoglobin 15.0 11.5 - 15.5 g/dL    Hematocrit 42.4 34.0 - 48.0 %    MCV 85.8 80.0 - 99.9 fL    MCH 30.4 26.0 - 35.0 pg    MCHC 35.4 (H) 32.0 - 34.5 g/dL    RDW 12.0 11.5 - 15.0 %    Platelets 198 130 - 450 k/uL    MPV 9.8 7.0 - 12.0 fL    Neutrophils % 61 43.0 - 80.0 %    Lymphocytes % 28 20.0 - 42.0 %    Monocytes % 5 2.0 - 12.0 %    Eosinophils % 4 0 - 6 %    Basophils % 1 0.0 - 2.0 %    Immature Granulocytes % 1 0.0 - 5.0 %    Neutrophils Absolute 4.31 1.80 - 7.30 k/uL    Lymphocytes Absolute 1.99 1.50 - 4.00 k/uL    Monocytes Absolute 0.37 0.10 - 0.95 k/uL    Eosinophils Absolute 0.26 0.05 - 0.50 k/uL    Basophils Absolute 0.06 0.00 - 0.20 k/uL    Immature Granulocytes Absolute 0.04 0.00 - 0.58 k/uL   Magnesium   Result Value Ref Range    Magnesium 2.1 1.6 - 2.6 mg/dL   Protime-INR   Result Value Ref Range    Protime 11.4 9.3 - 12.4 sec    INR 1.1    Troponin   Result Value Ref Range    Troponin, High Sensitivity <6 0 - 9 ng/L   D-Dimer, Quantitative   Result Value Ref Range    D-Dimer, Quant <200 0 - 230 ng/mL DDU   Troponin   Result Value Ref Range    Troponin, High Sensitivity <6 0 - 9 ng/L   EKG 12 Lead   Result Value Ref Range    Ventricular Rate 85 BPM    Atrial Rate 85 BPM    P-R Interval 126 ms    QRS Duration 80 ms    Q-T Interval 368 ms    QTc Calculation (Bazett) 437 ms    P Axis 57 degrees    R Axis 19 degrees

## 2024-10-21 LAB
EKG ATRIAL RATE: 85 BPM
EKG P AXIS: 57 DEGREES
EKG P-R INTERVAL: 126 MS
EKG Q-T INTERVAL: 368 MS
EKG QRS DURATION: 80 MS
EKG QTC CALCULATION (BAZETT): 437 MS
EKG R AXIS: 19 DEGREES
EKG T AXIS: 49 DEGREES
EKG VENTRICULAR RATE: 85 BPM

## 2024-10-21 PROCEDURE — 93010 ELECTROCARDIOGRAM REPORT: CPT | Performed by: INTERNAL MEDICINE

## 2024-11-08 ENCOUNTER — TELEPHONE (OUTPATIENT)
Dept: ORTHOPEDIC SURGERY | Age: 39
End: 2024-11-08

## 2024-11-08 NOTE — TELEPHONE ENCOUNTER
Please schedule routine. If patient has an acute injury or continues to notice color changes, she may utilize the ortho walk in clinic for evaluation prior to her appointment.

## 2024-11-08 NOTE — TELEPHONE ENCOUNTER
Patient called office today requesting an appointment.    Chief Complaint: left ankle     Date of onset: a few day ago  Pain scale: 7/10  Pain is: worse  New injury?: No    Previous injury/surgery:2016    Last Office Appointment: 12/7/2022    Her ankle is swollen. It hurts to go from sitting to standing. She said that last night her ankle was purple but this morning its the normal color .        No future appointments.    Electronically signed by Marie Schmitt on 11/8/2024 at 8:50 AM

## 2024-11-08 NOTE — TELEPHONE ENCOUNTER
Future Appointments   Date Time Provider Department Center   1/21/2025 11:00 AM Yuan Mclaughlin MD [facility]

## 2025-01-21 ENCOUNTER — OFFICE VISIT (OUTPATIENT)
Dept: ORTHOPEDIC SURGERY | Age: 40
End: 2025-01-21
Payer: COMMERCIAL

## 2025-01-21 DIAGNOSIS — M25.572 CHRONIC PAIN OF LEFT ANKLE: ICD-10-CM

## 2025-01-21 DIAGNOSIS — S82.872S CLOSED DISPLACED PILON FRACTURE OF LEFT TIBIA, SEQUELA: ICD-10-CM

## 2025-01-21 DIAGNOSIS — R52 PAIN: Primary | ICD-10-CM

## 2025-01-21 DIAGNOSIS — G89.29 CHRONIC PAIN OF LEFT ANKLE: ICD-10-CM

## 2025-01-21 DIAGNOSIS — M79.604 PAIN IN BOTH LOWER EXTREMITIES: ICD-10-CM

## 2025-01-21 DIAGNOSIS — T84.84XA PAINFUL ORTHOPAEDIC HARDWARE (HCC): ICD-10-CM

## 2025-01-21 DIAGNOSIS — M79.605 PAIN IN BOTH LOWER EXTREMITIES: ICD-10-CM

## 2025-01-21 DIAGNOSIS — M19.172 TRAUMATIC OSTEOARTHRITIS OF LEFT ANKLE: ICD-10-CM

## 2025-01-21 PROCEDURE — 1036F TOBACCO NON-USER: CPT | Performed by: PHYSICIAN ASSISTANT

## 2025-01-21 PROCEDURE — 99214 OFFICE O/P EST MOD 30 MIN: CPT | Performed by: PHYSICIAN ASSISTANT

## 2025-01-21 PROCEDURE — G8419 CALC BMI OUT NRM PARAM NOF/U: HCPCS | Performed by: PHYSICIAN ASSISTANT

## 2025-01-21 PROCEDURE — G8427 DOCREV CUR MEDS BY ELIG CLIN: HCPCS | Performed by: PHYSICIAN ASSISTANT

## 2025-01-21 RX ORDER — METHOCARBAMOL 750 MG/1
TABLET, FILM COATED ORAL
Qty: 90 TABLET | Refills: 5 | OUTPATIENT
Start: 2025-01-21

## 2025-01-21 NOTE — TELEPHONE ENCOUNTER
Looks like this was previously rx by Dr. Aleman. I would recommend the patient get her refill from her PCP

## 2025-01-21 NOTE — PROGRESS NOTES
range of motion with about 5 degrees of dorsiflexion and 20 degrees of plantarflexion  No pain with range of motion the subtalar joint  Does have significant crepitus on range of motion of the ankle tibiotalar joint itself  This also elicits her pain   Demonstrates active knee flexion/extension  States sensation intact to touch in sural/deep peroneal/superficial peroneal/saphenous/posterior tibial nerve distributions to foot/ankle.   Palpable dorsalis pedis and posterior tibialis pulses, cap refill brisk in toes, foot warm/perfused.     XR: 1/21/25   3 views left ankle demonstrate s/p ORIF left tibial pilon and distal fibular shaft fractures with hardware in stable position and alignment.  No evidence of hardware loosening or failure.  Fractures appear healed.  Moderate to severe traumatic OA noted in the tibiotalar joint.  There does appear to be an osteophyte at the anterior tibiotalar joint.    LMP 02/10/2016 (Approximate)     ASSESSMENT:   Diagnosis Orders   1. Pain  XR ANKLE LEFT (MIN 3 VIEWS)    CT ANKLE LEFT WO CONTRAST      2. Closed displaced pilon fracture of left tibia, sequela        3. Painful orthopaedic hardware (HCC)        4. Chronic pain of left ankle        5. Traumatic osteoarthritis of left ankle          PLAN:  X-rays reviewed and discussed.  Patient reviewed and discussed with Dr. Mclaughlin.  Discussed that she does have moderate to severe traumatic OA at the tibiotalar joint with an osteophyte anteriorly that may be causing some of her restricted motion at the ankle.  At this point, recommend left ankle CT to make sure she is healed.  She will be seen after CT to discuss possible CLAYTON with excision of anterior ankle osteophyte.  Also discussed that she may be a candidate for ankle fusion or replacement in the future.  Patient is in agreement with this plan.    You were ordered CT of left ankle by your Orthopedic Provider.  If you do not hear from that department please contact: CT- [phone]

## 2025-01-21 NOTE — TELEPHONE ENCOUNTER
Received call from patient requesting refill of Robaxin 750 mg at [facility].      Last OV: 1/21/25    Medication pended and routed to providers for decision and signature.    No future appointments.    Electronically signed by Jody Nolen RN on 1/21/2025 at 1:29 PM

## 2025-01-21 NOTE — PATIENT INSTRUCTIONS
You were ordered CT of left ankle  by your Orthopedic Provider.  If you do not hear from that department please contact: CT- [phone]    Follow-up after CT to review

## 2025-02-12 ENCOUNTER — HOSPITAL ENCOUNTER (OUTPATIENT)
Dept: CT IMAGING | Age: 40
Discharge: HOME | End: 2025-02-14
Payer: COMMERCIAL

## 2025-02-12 DIAGNOSIS — R52 PAIN: ICD-10-CM

## 2025-02-12 PROCEDURE — 73700 CT LOWER EXTREMITY W/O DYE: CPT

## 2025-02-18 ENCOUNTER — OFFICE VISIT (OUTPATIENT)
Dept: ORTHOPEDIC SURGERY | Age: 40
End: 2025-02-18
Payer: COMMERCIAL

## 2025-02-18 DIAGNOSIS — M19.172 TRAUMATIC OSTEOARTHRITIS OF LEFT ANKLE: Primary | ICD-10-CM

## 2025-02-18 PROCEDURE — 99214 OFFICE O/P EST MOD 30 MIN: CPT | Performed by: ORTHOPAEDIC SURGERY

## 2025-02-18 PROCEDURE — G8419 CALC BMI OUT NRM PARAM NOF/U: HCPCS | Performed by: ORTHOPAEDIC SURGERY

## 2025-02-18 PROCEDURE — 1036F TOBACCO NON-USER: CPT | Performed by: ORTHOPAEDIC SURGERY

## 2025-02-18 PROCEDURE — G8427 DOCREV CUR MEDS BY ELIG CLIN: HCPCS | Performed by: ORTHOPAEDIC SURGERY

## 2025-02-18 RX ORDER — METHOCARBAMOL 750 MG/1
750 TABLET, FILM COATED ORAL 3 TIMES DAILY
Qty: 90 TABLET | Refills: 0 | Status: SHIPPED | OUTPATIENT
Start: 2025-02-18 | End: 2025-03-20

## 2025-02-18 NOTE — PATIENT INSTRUCTIONS
Procedure: Removal of tibial hardware, Left ankle tibiotalar fusion    You will need medical clearance prior to surgery.     Please call your doctor to set up an appointment for medical clearance if necessary as soon as possible and have the office fax your medical clearance to : Yanique Fernandez ANDREW FAX: [phone], PHONE: [phone]    You need medical clearance from: Primary Care Provider    Your surgery is scheduled with Dr. Yuan Mclaughlin MD at the main [facility] in [place] .  You will need to report to Preop area  that morning 2 hours prior to surgery. Please call our office once PCP care established to choose surgery date .   All surgery start times are subject to change. You will be notified by office and/or PAT department if your surgery time changes. If you need to cancel/reschedule your surgery for any reason, please contact Yanique at [phone], ext #[phone] ASAP.   You are having Outpatient surgery, but plan for 1-2 nights in the hospital for recovery if needed.  Preadmission Testing (PAT) department at [facility] will contact you with further details regarding pre-operative blood work, where to park and enter the hospital, your medication list, etc. If you have any surgery related questions please contact PAT at [facility] at [phone].  Nothing to eat or drink after midnight the night before surgery.  You may take a pain pill and any other medicine PAT instructs you to take with small sip of water if needed.  Continue with ice and elevation to reduce swelling  Weight bearing as tolerated left lower extremity, use assistive devices if needed (wheelchair, walker, crutches, cane, etc).  If you are taking Aspirin or Lovenox, hold the day of surgery. If taking Eliquis, hold this 48 hours prior to surgery. Hold all NSAIDs (non-steroidal anti-inflammatories like Advil, motrin, ibuprofen, etc) 7 days prior to surgery.    Call office with any question or concerns:

## 2025-02-18 NOTE — PROGRESS NOTES
hemithoraces  Cardiac: distal pulses palpable, skin and extremities well perfused  Neurological: alert, oriented X3, normal speech, no focal findings or movement disorder noted, motor and sensory grossly normal bilaterally, normal muscle tone, no tremors, strength 5/5, normal gait and station  HEENT: normochephalic atraumatic, external ears and eyes normal, sclera normal, neck supple, no nasal discharge.   Extremities:   peripheral pulses normal, no edema, redness or tenderness in the calves   Skin: normal coloration, no rashes or open wounds, no suspicious skin lesions noted  Psych: Affect euthymic   Musculoskeletal:   Extremity:  Left ankle  Previous incision well-healed  No signs of infection  Minimal warmth  No erythema  Tenderness to palpation about the joint  No significant nerve irritation of the foot  2/4 dorsalis pedis and posterior tibial pulse  Capillary for less than 3 seconds  Gross sensation intact distally  Ankle range of motion's 3 to 5 degrees short of neutral on dorsiflexion and plantarflexion is approximate 30 degrees  There is crepitus on range of motion of the ankle  Her calves are soft and nontender and she has no knee pain above the left ankle.    LMP 02/10/2016 (Approximate)      CT reviewed showing posttraumatic arthritis of the tibiotalar joint which is severe in nature.  Her hardware is in good position.       ASSESSMENT:     Diagnosis Orders   1. Traumatic osteoarthritis of left ankle  Kleber Willson MD, Family Medicine, [place]           Discussion: Please see detail discussion above.  Will plan on starting to plan her ankle fusion along with primary care for medical clearance.    PLAN:  Referral to primary care    Robaxin for pain control which has worked in the past for her    Will stay in contact with her after she is appropriately cleared we will set her up for left tibiotalar fusion with hardware removal from left tibia.        ELECTRONICALLY signed by:    Yuan

## 2025-02-21 ENCOUNTER — OFFICE VISIT (OUTPATIENT)
Dept: FAMILY MEDICINE CLINIC | Age: 40
End: 2025-02-21
Payer: COMMERCIAL

## 2025-02-21 VITALS
TEMPERATURE: 97.3 F | HEIGHT: 60 IN | OXYGEN SATURATION: 97 % | SYSTOLIC BLOOD PRESSURE: 101 MMHG | BODY MASS INDEX: 26.62 KG/M2 | HEART RATE: 87 BPM | RESPIRATION RATE: 18 BRPM | DIASTOLIC BLOOD PRESSURE: 70 MMHG | WEIGHT: 135.6 LBS

## 2025-02-21 DIAGNOSIS — D68.59 HYPERCOAGULABLE STATE: Primary | Chronic | ICD-10-CM

## 2025-02-21 DIAGNOSIS — F12.90 MARIJUANA USE: ICD-10-CM

## 2025-02-21 DIAGNOSIS — B18.2 CHRONIC HEPATITIS C WITHOUT HEPATIC COMA (HCC): Chronic | ICD-10-CM

## 2025-02-21 DIAGNOSIS — F19.11 SUBSTANCE ABUSE IN REMISSION (HCC): Chronic | ICD-10-CM

## 2025-02-21 DIAGNOSIS — Z01.818 PREOP EXAMINATION: ICD-10-CM

## 2025-02-21 DIAGNOSIS — D68.59 THROMBOPHILIA: ICD-10-CM

## 2025-02-21 DIAGNOSIS — R73.01 IMPAIRED FASTING BLOOD SUGAR: ICD-10-CM

## 2025-02-21 DIAGNOSIS — G89.21 CHRONIC PAIN DUE TO TRAUMA: Chronic | ICD-10-CM

## 2025-02-21 DIAGNOSIS — Z12.31 BREAST CANCER SCREENING BY MAMMOGRAM: ICD-10-CM

## 2025-02-21 PROCEDURE — 1036F TOBACCO NON-USER: CPT | Performed by: STUDENT IN AN ORGANIZED HEALTH CARE EDUCATION/TRAINING PROGRAM

## 2025-02-21 PROCEDURE — G8427 DOCREV CUR MEDS BY ELIG CLIN: HCPCS | Performed by: STUDENT IN AN ORGANIZED HEALTH CARE EDUCATION/TRAINING PROGRAM

## 2025-02-21 PROCEDURE — G8419 CALC BMI OUT NRM PARAM NOF/U: HCPCS | Performed by: STUDENT IN AN ORGANIZED HEALTH CARE EDUCATION/TRAINING PROGRAM

## 2025-02-21 SDOH — ECONOMIC STABILITY: FOOD INSECURITY: WITHIN THE PAST 12 MONTHS, THE FOOD YOU BOUGHT JUST DIDN'T LAST AND YOU DIDN'T HAVE MONEY TO GET MORE.: NEVER TRUE

## 2025-02-21 SDOH — ECONOMIC STABILITY: FOOD INSECURITY: WITHIN THE PAST 12 MONTHS, YOU WORRIED THAT YOUR FOOD WOULD RUN OUT BEFORE YOU GOT MONEY TO BUY MORE.: NEVER TRUE

## 2025-02-21 ASSESSMENT — PATIENT HEALTH QUESTIONNAIRE - PHQ9
SUM OF ALL RESPONSES TO PHQ9 QUESTIONS 1 & 2: 0
2. FEELING DOWN, DEPRESSED OR HOPELESS: NOT AT ALL
SUM OF ALL RESPONSES TO PHQ QUESTIONS 1-9: 0
1. LITTLE INTEREST OR PLEASURE IN DOING THINGS: NOT AT ALL
SUM OF ALL RESPONSES TO PHQ QUESTIONS 1-9: 0

## 2025-03-10 ENCOUNTER — HOSPITAL ENCOUNTER (OUTPATIENT)
Dept: INFUSION THERAPY | Age: 40
Discharge: HOME | End: 2025-03-10
Payer: COMMERCIAL

## 2025-03-10 ENCOUNTER — OFFICE VISIT (OUTPATIENT)
Dept: ONCOLOGY | Age: 40
End: 2025-03-10
Payer: COMMERCIAL

## 2025-03-10 VITALS
SYSTOLIC BLOOD PRESSURE: 98 MMHG | WEIGHT: 135.9 LBS | TEMPERATURE: 97.8 F | OXYGEN SATURATION: 95 % | HEIGHT: 60 IN | BODY MASS INDEX: 26.68 KG/M2 | HEART RATE: 86 BPM | DIASTOLIC BLOOD PRESSURE: 62 MMHG

## 2025-03-10 DIAGNOSIS — Z86.718 HISTORY OF VENOUS THROMBOEMBOLISM: ICD-10-CM

## 2025-03-10 DIAGNOSIS — Z86.718 HISTORY OF VENOUS THROMBOEMBOLISM: Primary | ICD-10-CM

## 2025-03-10 LAB
ALBUMIN SERPL-MCNC: 4.5 G/DL (ref 3.5–5.2)
ALP SERPL-CCNC: 66 U/L (ref 35–104)
ALT SERPL-CCNC: 56 U/L (ref 0–32)
ANION GAP SERPL CALCULATED.3IONS-SCNC: 15 MMOL/L (ref 7–16)
AST SERPL-CCNC: 41 U/L (ref 0–31)
AT III ACT/NOR PPP CHRO: 92 % (ref 83–121)
BASOPHILS # BLD: 0.07 K/UL (ref 0–0.2)
BASOPHILS NFR BLD: 1 % (ref 0–2)
BILIRUB SERPL-MCNC: 0.4 MG/DL (ref 0–1.2)
BUN SERPL-MCNC: 5 MG/DL (ref 6–20)
CALCIUM SERPL-MCNC: 9.1 MG/DL (ref 8.6–10.2)
CHLORIDE SERPL-SCNC: 106 MMOL/L (ref 98–107)
CO2 SERPL-SCNC: 20 MMOL/L (ref 22–29)
CREAT SERPL-MCNC: 0.5 MG/DL (ref 0.5–1)
DILUTE RUSSELL VIPER VENOM TIME: NEGATIVE
EOSINOPHIL # BLD: 0.4 K/UL (ref 0.05–0.5)
EOSINOPHILS RELATIVE PERCENT: 5 % (ref 0–6)
ERYTHROCYTE [DISTWIDTH] IN BLOOD BY AUTOMATED COUNT: 12.5 % (ref 11.5–15)
GFR, ESTIMATED: >90 ML/MIN/1.73M2
GLUCOSE SERPL-MCNC: 85 MG/DL (ref 74–99)
HCT VFR BLD AUTO: 45.5 % (ref 34–48)
HGB BLD-MCNC: 15.4 G/DL (ref 11.5–15.5)
IMM GRANULOCYTES # BLD AUTO: 0.03 K/UL (ref 0–0.58)
IMM GRANULOCYTES NFR BLD: 0 % (ref 0–5)
LYMPHOCYTES NFR BLD: 1.86 K/UL (ref 1.5–4)
LYMPHOCYTES RELATIVE PERCENT: 24 % (ref 20–42)
MCH RBC QN AUTO: 30.7 PG (ref 26–35)
MCHC RBC AUTO-ENTMCNC: 33.8 G/DL (ref 32–34.5)
MCV RBC AUTO: 90.8 FL (ref 80–99.9)
MONOCYTES NFR BLD: 0.42 K/UL (ref 0.1–0.95)
MONOCYTES NFR BLD: 6 % (ref 2–12)
NEUTROPHILS NFR BLD: 64 % (ref 43–80)
NEUTS SEG NFR BLD: 4.9 K/UL (ref 1.8–7.3)
PLATELET # BLD AUTO: 186 K/UL (ref 130–450)
PMV BLD AUTO: 10.5 FL (ref 7–12)
POTASSIUM SERPL-SCNC: 4 MMOL/L (ref 3.5–5)
PROT SERPL-MCNC: 7.2 G/DL (ref 6.4–8.3)
PROTEIN C ACTIVITY: 91 % (ref 68–165)
RBC # BLD AUTO: 5.01 M/UL (ref 3.5–5.5)
SODIUM SERPL-SCNC: 141 MMOL/L (ref 132–146)
WBC OTHER # BLD: 7.7 K/UL (ref 4.5–11.5)

## 2025-03-10 PROCEDURE — 85303 CLOT INHIBIT PROT C ACTIVITY: CPT

## 2025-03-10 PROCEDURE — 36415 COLL VENOUS BLD VENIPUNCTURE: CPT

## 2025-03-10 PROCEDURE — 99214 OFFICE O/P EST MOD 30 MIN: CPT

## 2025-03-10 PROCEDURE — 81240 F2 GENE: CPT

## 2025-03-10 PROCEDURE — 1036F TOBACCO NON-USER: CPT | Performed by: INTERNAL MEDICINE

## 2025-03-10 PROCEDURE — 85025 COMPLETE CBC W/AUTO DIFF WBC: CPT

## 2025-03-10 PROCEDURE — G8427 DOCREV CUR MEDS BY ELIG CLIN: HCPCS | Performed by: INTERNAL MEDICINE

## 2025-03-10 PROCEDURE — 86146 BETA-2 GLYCOPROTEIN ANTIBODY: CPT

## 2025-03-10 PROCEDURE — 85300 ANTITHROMBIN III ACTIVITY: CPT

## 2025-03-10 PROCEDURE — G8419 CALC BMI OUT NRM PARAM NOF/U: HCPCS | Performed by: INTERNAL MEDICINE

## 2025-03-10 PROCEDURE — 80053 COMPREHEN METABOLIC PANEL: CPT

## 2025-03-10 PROCEDURE — 99204 OFFICE O/P NEW MOD 45 MIN: CPT | Performed by: INTERNAL MEDICINE

## 2025-03-10 PROCEDURE — 86147 CARDIOLIPIN ANTIBODY EA IG: CPT

## 2025-03-10 PROCEDURE — 85613 RUSSELL VIPER VENOM DILUTED: CPT

## 2025-03-10 PROCEDURE — 85306 CLOT INHIBIT PROT S FREE: CPT

## 2025-03-10 NOTE — PROGRESS NOTES
Patient provided with discharge instructions, patient has MYCHART.  All questions answered.  Patient understands follow up plan of care.     
prevention  6.  Reassess in 12 weeks or with any noted change in patient condition which places them at a risk for a fall   4-6 Moderate Risk 1.  Provide assistance as indicated for ambulation activities  2.  Reorient confused/cognitively impaired patient  3.  Call-light/bell within patient's reach  4.  Chair/bed in low position, stretcher/bed with siderails up except when performing patient care activities  5.  Educate patient/family/caregiver on falls prevention  6.  Falls risk precaution (Yellow sticker Level II) placed on patient chart   7 or   Higher High Risk 1.  Place patient in easily observable treatment room  2.  Patient attended at all times by family member or staff  3.  Provide assistance as indicated for ambulation activities  4.  Reorient confused/cognitively impaired patient  5.  Call-light/bell within patient's reach  6.  Chair/bed in low position, stretcher/bed with siderails up except when performing patient care activities  7.  Educate patient/family/caregiver on falls prevention  8.  Falls risk precaution (Yellow sticker Level III) placed on patient chart       Chet Barriga RN    
MD Nestor   Medical Oncology  [facility]  03/10/2025    [facility] Office  P: [phone]  F: [phone]        [facility] ([place] Office  P: [phone]  F: [phone]

## 2025-03-12 LAB
B2 GLYCOPROT1 IGG SERPL IA-ACNC: <0.6 ELISA U/ML (ref 0–7)
B2 GLYCOPROT1 IGM SERPL IA-ACNC: <0.9 ELISA U/ML (ref 0–7)
CARDIOLIPIN IGG SER IA-ACNC: 1 GPL (ref 0–10)
CARDIOLIPIN IGM SER IA-ACNC: 1 MPL (ref 0–10)

## 2025-03-16 LAB
F2 C.20210G>A GENO BLD/T: NEGATIVE
SPECIMEN SOURCE: NORMAL

## 2025-03-17 LAB — PROTEIN S ACTIVITY: 61 % (ref 59–130)

## 2025-03-25 ENCOUNTER — HOSPITAL ENCOUNTER (OUTPATIENT)
Dept: GENERAL RADIOLOGY | Age: 40
Discharge: HOME | End: 2025-03-27
Payer: COMMERCIAL

## 2025-03-25 VITALS — HEIGHT: 60 IN | WEIGHT: 135 LBS | BODY MASS INDEX: 26.5 KG/M2

## 2025-03-25 DIAGNOSIS — Z12.31 BREAST CANCER SCREENING BY MAMMOGRAM: ICD-10-CM

## 2025-03-25 PROCEDURE — 77063 BREAST TOMOSYNTHESIS BI: CPT

## 2025-03-27 ENCOUNTER — TELEPHONE (OUTPATIENT)
Dept: ORTHOPEDIC SURGERY | Age: 40
End: 2025-03-27

## 2025-03-27 NOTE — TELEPHONE ENCOUNTER
Patient is currently waiting to have Left Tibiotalar Fusion surgery performed by Dr. Mclaughlin. She was instructed during her last office visit on 2-, she needed to be seen by Primary Care to get established and cleared for surgery before Orthopedic surgery could be scheduled.     Patient was seen by PCP as well as Medical Oncology. After reviewing the office visits notes, it is unlcear if she was cleared by either one for Orthopedic Surgery.     Letter was sent out to PCP and Medical Oncology today to find out if she is cleared for surgery. Waiting on a response back from both offices before I can schedule surgery.

## 2025-03-31 ENCOUNTER — SCHEDULED TELEPHONE ENCOUNTER (OUTPATIENT)
Dept: ONCOLOGY | Age: 40
End: 2025-03-31
Payer: COMMERCIAL

## 2025-03-31 DIAGNOSIS — Z86.718 HISTORY OF VENOUS THROMBOEMBOLISM: Primary | ICD-10-CM

## 2025-03-31 PROCEDURE — 99212 OFFICE O/P EST SF 10 MIN: CPT | Performed by: CLINICAL NURSE SPECIALIST

## 2025-03-31 NOTE — PROGRESS NOTES
Patient was Telephone virtual visit.  Patient has MyChart.  No rtc disposition note given.  Provider progress note stated patient can proceed with surgery from a hematology POV.  Patient is having Left Tibiotalar Fusion surgery.

## 2025-03-31 NOTE — PROGRESS NOTES
Total Time: minutes: <5 minutes (not billable)     Sheri Gallegos was evaluated through a synchronous (real-time) audio encounter. Patient identification was verified at the start of the visit. She (or guardian if applicable) is aware that this is a billable service, which includes applicable co-pays. This visit was conducted with the patient's (and/or legal guardian's) verbal consent. She has not had a related appointment within my department in the past 7 days or scheduled within the next 24 hours.   The patient was located at Home: [address] [place] [address] [place] [address].  The provider was located at Facility (Appt Dept): [address] [place] [phone].    Note: not billable if this call serves to triage the patient into an appointment for the relevant concern  Yes, I confirm.   Sheri Gallegos is a 39 y.o. female evaluated via telephone on 3/31/2025 for No chief complaint on file.  .Reviewed lab work at length  , the factor V leiden mutation lab was not drawn although it was ordered .   Patient's hypercoagulable workup prothrombin gene ,protein C&S,AT3 ,phospholipid antibody  was negative. She otherwise can proceed with surgery with the following recommendations from a hematology POV.   Our recommend prophylactic anticoagulation/Lovenox prophylactic dose around surgery at least till she is fully mobile after surgery or per orthopedic protocol whichever is longer.   Per pt she had factor 5 mutation lab work 5-7 years ago and did not have it . This test is typically performed only once to determine  if pt carries mutation .   Assessment & Plan    No follow-ups on file.      Subjective   Prior to Visit Medications    Medication Sig Taking? Authorizing Provider   aspirin 81 MG EC tablet Take 1 tablet by mouth daily  Provider, MD Disha   metoprolol tartrate (LOPRESSOR) 25 MG tablet TAKE 1 TABLET BY MOUTH 2 TIMES DAILY AS NEEDED (TACHYCARDIA) IF BASELINE METOPROLOL DOES NOT PREVENT TACHYCARDIA  Love

## 2025-04-01 ENCOUNTER — CLINICAL DOCUMENTATION (OUTPATIENT)
Dept: FAMILY MEDICINE CLINIC | Age: 40
End: 2025-04-01

## 2025-04-01 NOTE — PROGRESS NOTES
Pt medically optimized for surgery and considered low risk overall. Main concern is risk of thrombosis. Please refer to hematology recommendations for periop anticoagulation management.

## 2025-04-15 ENCOUNTER — PREP FOR PROCEDURE (OUTPATIENT)
Dept: ORTHOPEDIC SURGERY | Age: 40
End: 2025-04-15

## 2025-04-15 ENCOUNTER — TELEPHONE (OUTPATIENT)
Dept: ORTHOPEDIC SURGERY | Age: 40
End: 2025-04-15

## 2025-04-15 DIAGNOSIS — G89.29 CHRONIC PAIN OF LEFT ANKLE: ICD-10-CM

## 2025-04-15 DIAGNOSIS — M25.572 CHRONIC PAIN OF LEFT ANKLE: ICD-10-CM

## 2025-04-15 DIAGNOSIS — M19.172 TRAUMATIC OSTEOARTHRITIS OF LEFT ANKLE: ICD-10-CM

## 2025-04-15 DIAGNOSIS — Z96.698 PRESENCE OF OTHER ORTHOPEDIC JOINT IMPLANTS: ICD-10-CM

## 2025-04-15 PROBLEM — T84.84XA PAIN DUE TO INTERNAL ORTHOPEDIC PROSTHETIC DEVICE: Status: ACTIVE | Noted: 2025-04-15

## 2025-04-15 NOTE — TELEPHONE ENCOUNTER
Patient Name:  Sheri Gallegos  Patient :  1985        DIAGNOSIS & PROCEDURE:                       Procedure/Operation: Removal of Orthopedic Hardware from Left Ankle, Left Ankle Tibiotalar Joint Fusion            Diagnosis:  Traumatic OA of Left Ankle, Painful Orthopedic Hardware, S/P Left Tibia and Fibula Fx ORIF, Chronic Left Ankle Pain     Location:  [facility]    Surgeon:  Dr. Yuan Mclaughlin MD    SCHEDULING INFORMATION:                          Date: 2025    Time: 8:30 am              Anesthesia:  General                                              Status: Outpatient    Special Comments:  Medical Clearance needed from PCP prior to surgery. Medical Clearance/Risk Assessment from Hematology/Oncology prior to surgery.        Vendor: [employer]  []   Notified     Electronically signed by Yanique Fernandez MA on 4/15/2025 at 12:06 PM

## 2025-04-16 RX ORDER — SODIUM CHLORIDE 0.9 % (FLUSH) 0.9 %
5-40 SYRINGE (ML) INJECTION EVERY 12 HOURS SCHEDULED
Status: CANCELLED | OUTPATIENT
Start: 2025-04-16

## 2025-04-16 RX ORDER — SODIUM CHLORIDE 0.9 % (FLUSH) 0.9 %
5-40 SYRINGE (ML) INJECTION PRN
Status: CANCELLED | OUTPATIENT
Start: 2025-04-16

## 2025-04-16 RX ORDER — SODIUM CHLORIDE 9 MG/ML
INJECTION, SOLUTION INTRAVENOUS PRN
Status: CANCELLED | OUTPATIENT
Start: 2025-04-16

## 2025-04-25 RX ORDER — METHOCARBAMOL 750 MG/1
750 TABLET, FILM COATED ORAL NIGHTLY
COMMUNITY

## 2025-04-25 NOTE — PROGRESS NOTES
[facility]   PRE-ADMISSION TESTING GENERAL INSTRUCTIONS  PAT Phone Number: [phone]      GENERAL INSTRUCTIONS:    [x] Antibacterial Soap Shower Night before AND the Morning of procedure.  [] The Night Before Surgery: Take an antibacterial soap shower - followed by CHG Wipes.   -The Morning of Surgery: Repeat CHG Wipes.  [x] Do not wear makeup, lotions, powders, deodorant the morning of surgery.  [x] No solid food after midnight. You may have SIPS of clear liquids up until 2 hours before your arrival time to the hospital.   [x] You may brush your teeth, gargle, but do not swallow water.   [x] No tobacco products, illegal drugs, or alcohol within 24 hours of your surgery.  [x] Jewelry or valuables should not be brought to the hospital. All body and/or tongue piercing's must be removed prior to arriving to hospital. No contact lens or hair pins.   [x] Arrange transportation with a responsible adult  to and from the hospital. Arrange for someone to be with you for the remainder of the day and for 24 hours after your procedure due to having had anesthesia.          -Who will be your  for transportation? mom        -Who will be staying with you for 24 hrs after your procedure? mom  [x] Bring insurance card and photo ID.  [] Bring copy of living will or healthcare power of  papers to be placed in your electronic record.  [] Urine Pregnancy test will be preformed the day of surgery. A specimen sample may be brought from home.  [] Transfusion (Green) Bracelet: Please bring with you to hospital, day of surgery.     PARKING INSTRUCTIONS:     [x] ARRIVAL DATE & TIME: 5/1 @ 0815  [x] Times are subject to change. We will contact you the business day before surgery if that were to occur.  [x] Enter into the [facility] Entrance. Two people may accompany you. Masks are not required.  [x] Parking Lot \"I\" is where you will park. It is located on the corner of [facility] and  order you arrive as there are many variables that are involved in patient preparation. Your patience is greatly appreciated as you wait for your nurse.   [x] Delays may occur with surgery and staff will make a sincere effort to keep you informed of delays. If any delays occur with your procedure, we apologize ahead of time for your inconvenience as we recognize the value of your time.

## 2025-04-30 ENCOUNTER — ANESTHESIA EVENT (OUTPATIENT)
Dept: OPERATING ROOM | Age: 40
End: 2025-04-30
Payer: COMMERCIAL

## 2025-04-30 NOTE — PROGRESS NOTES
Informed patient of the following: Your scheduled surgery time has been changed you will need to arrive at 7:45 am.  Patient verbalized understanding repeated arrival time 7:45 am.

## 2025-05-01 ENCOUNTER — HOSPITAL ENCOUNTER (OUTPATIENT)
Dept: GENERAL RADIOLOGY | Age: 40
Setting detail: OUTPATIENT SURGERY
Discharge: HOME | End: 2025-05-03
Attending: ORTHOPAEDIC SURGERY
Payer: COMMERCIAL

## 2025-05-01 ENCOUNTER — HOSPITAL ENCOUNTER (OUTPATIENT)
Age: 40
Setting detail: OUTPATIENT SURGERY
Discharge: HOME | End: 2025-05-01
Attending: ORTHOPAEDIC SURGERY | Admitting: ORTHOPAEDIC SURGERY
Payer: COMMERCIAL

## 2025-05-01 ENCOUNTER — APPOINTMENT (OUTPATIENT)
Dept: GENERAL RADIOLOGY | Age: 40
End: 2025-05-01
Attending: ORTHOPAEDIC SURGERY
Payer: COMMERCIAL

## 2025-05-01 ENCOUNTER — ANESTHESIA (OUTPATIENT)
Dept: OPERATING ROOM | Age: 40
End: 2025-05-01
Payer: COMMERCIAL

## 2025-05-01 VITALS
SYSTOLIC BLOOD PRESSURE: 105 MMHG | WEIGHT: 125 LBS | BODY MASS INDEX: 23.6 KG/M2 | DIASTOLIC BLOOD PRESSURE: 65 MMHG | TEMPERATURE: 97.2 F | HEIGHT: 61 IN | RESPIRATION RATE: 20 BRPM | OXYGEN SATURATION: 96 % | HEART RATE: 75 BPM

## 2025-05-01 DIAGNOSIS — R52 PAIN: ICD-10-CM

## 2025-05-01 DIAGNOSIS — M25.572 CHRONIC PAIN OF LEFT ANKLE: ICD-10-CM

## 2025-05-01 DIAGNOSIS — Z96.698 PRESENCE OF OTHER ORTHOPEDIC JOINT IMPLANTS: ICD-10-CM

## 2025-05-01 DIAGNOSIS — G89.18 POST-OP PAIN: ICD-10-CM

## 2025-05-01 DIAGNOSIS — T84.84XA PAIN DUE TO INTERNAL ORTHOPEDIC PROSTHETIC DEVICE, INITIAL ENCOUNTER: ICD-10-CM

## 2025-05-01 DIAGNOSIS — Z01.812 PRE-OPERATIVE LABORATORY EXAMINATION: Primary | ICD-10-CM

## 2025-05-01 DIAGNOSIS — Z98.890 S/P MUSCULOSKELETAL SYSTEM SURGERY: ICD-10-CM

## 2025-05-01 DIAGNOSIS — M19.172 TRAUMATIC OSTEOARTHRITIS OF LEFT ANKLE: ICD-10-CM

## 2025-05-01 DIAGNOSIS — G89.29 CHRONIC PAIN OF LEFT ANKLE: ICD-10-CM

## 2025-05-01 LAB
ANION GAP SERPL CALCULATED.3IONS-SCNC: 10 MMOL/L (ref 7–16)
BUN SERPL-MCNC: 8 MG/DL (ref 6–20)
CALCIUM SERPL-MCNC: 9 MG/DL (ref 8.6–10)
CHLORIDE SERPL-SCNC: 106 MMOL/L (ref 98–107)
CO2 SERPL-SCNC: 23 MMOL/L (ref 22–29)
CREAT SERPL-MCNC: 0.5 MG/DL (ref 0.5–1)
EKG ATRIAL RATE: 80 BPM
EKG P AXIS: 70 DEGREES
EKG P-R INTERVAL: 126 MS
EKG Q-T INTERVAL: 368 MS
EKG QRS DURATION: 82 MS
EKG QTC CALCULATION (BAZETT): 424 MS
EKG R AXIS: 31 DEGREES
EKG T AXIS: 49 DEGREES
EKG VENTRICULAR RATE: 80 BPM
ERYTHROCYTE [DISTWIDTH] IN BLOOD BY AUTOMATED COUNT: 12.4 % (ref 11.5–15)
GFR, ESTIMATED: >90 ML/MIN/1.73M2
GLUCOSE SERPL-MCNC: 98 MG/DL (ref 74–99)
HCT VFR BLD AUTO: 44.9 % (ref 34–48)
HGB BLD-MCNC: 15.5 G/DL (ref 11.5–15.5)
MCH RBC QN AUTO: 31.3 PG (ref 26–35)
MCHC RBC AUTO-ENTMCNC: 34.5 G/DL (ref 32–34.5)
MCV RBC AUTO: 90.5 FL (ref 80–99.9)
PLATELET # BLD AUTO: 157 K/UL (ref 130–450)
PMV BLD AUTO: 10 FL (ref 7–12)
POTASSIUM SERPL-SCNC: 4 MMOL/L (ref 3.5–5.1)
RBC # BLD AUTO: 4.96 M/UL (ref 3.5–5.5)
SODIUM SERPL-SCNC: 139 MMOL/L (ref 136–145)
WBC OTHER # BLD: 6 K/UL (ref 4.5–11.5)

## 2025-05-01 PROCEDURE — C1713 ANCHOR/SCREW BN/BN,TIS/BN: HCPCS | Performed by: ORTHOPAEDIC SURGERY

## 2025-05-01 PROCEDURE — 2580000003 HC RX 258: Performed by: NURSE ANESTHETIST, CERTIFIED REGISTERED

## 2025-05-01 PROCEDURE — 85027 COMPLETE CBC AUTOMATED: CPT

## 2025-05-01 PROCEDURE — C1769 GUIDE WIRE: HCPCS | Performed by: ORTHOPAEDIC SURGERY

## 2025-05-01 PROCEDURE — 7100000000 HC PACU RECOVERY - FIRST 15 MIN: Performed by: ORTHOPAEDIC SURGERY

## 2025-05-01 PROCEDURE — 2580000003 HC RX 258: Performed by: PHYSICIAN ASSISTANT

## 2025-05-01 PROCEDURE — 3700000000 HC ANESTHESIA ATTENDED CARE: Performed by: ORTHOPAEDIC SURGERY

## 2025-05-01 PROCEDURE — 3600000015 HC SURGERY LEVEL 5 ADDTL 15MIN: Performed by: ORTHOPAEDIC SURGERY

## 2025-05-01 PROCEDURE — 2720000010 HC SURG SUPPLY STERILE: Performed by: ORTHOPAEDIC SURGERY

## 2025-05-01 PROCEDURE — 2500000003 HC RX 250 WO HCPCS: Performed by: PHYSICIAN ASSISTANT

## 2025-05-01 PROCEDURE — 3600000005 HC SURGERY LEVEL 5 BASE: Performed by: ORTHOPAEDIC SURGERY

## 2025-05-01 PROCEDURE — 64445 NJX AA&/STRD SCIATIC NRV IMG: CPT | Performed by: STUDENT IN AN ORGANIZED HEALTH CARE EDUCATION/TRAINING PROGRAM

## 2025-05-01 PROCEDURE — 7100000010 HC PHASE II RECOVERY - FIRST 15 MIN: Performed by: ORTHOPAEDIC SURGERY

## 2025-05-01 PROCEDURE — 2500000003 HC RX 250 WO HCPCS: Performed by: NURSE ANESTHETIST, CERTIFIED REGISTERED

## 2025-05-01 PROCEDURE — 64447 NJX AA&/STRD FEMORAL NRV IMG: CPT | Performed by: STUDENT IN AN ORGANIZED HEALTH CARE EDUCATION/TRAINING PROGRAM

## 2025-05-01 PROCEDURE — 80048 BASIC METABOLIC PNL TOTAL CA: CPT

## 2025-05-01 PROCEDURE — 6360000002 HC RX W HCPCS: Performed by: PHYSICIAN ASSISTANT

## 2025-05-01 PROCEDURE — 6360000002 HC RX W HCPCS: Performed by: ORTHOPAEDIC SURGERY

## 2025-05-01 PROCEDURE — 27870 FUSION OF ANKLE JOINT OPEN: CPT | Performed by: ORTHOPAEDIC SURGERY

## 2025-05-01 PROCEDURE — 6360000002 HC RX W HCPCS: Performed by: NURSE ANESTHETIST, CERTIFIED REGISTERED

## 2025-05-01 PROCEDURE — 6360000002 HC RX W HCPCS: Performed by: STUDENT IN AN ORGANIZED HEALTH CARE EDUCATION/TRAINING PROGRAM

## 2025-05-01 PROCEDURE — 93005 ELECTROCARDIOGRAM TRACING: CPT | Performed by: ANESTHESIOLOGY

## 2025-05-01 PROCEDURE — 73610 X-RAY EXAM OF ANKLE: CPT

## 2025-05-01 PROCEDURE — 2709999900 HC NON-CHARGEABLE SUPPLY: Performed by: ORTHOPAEDIC SURGERY

## 2025-05-01 PROCEDURE — 3700000001 HC ADD 15 MINUTES (ANESTHESIA): Performed by: ORTHOPAEDIC SURGERY

## 2025-05-01 PROCEDURE — 6360000002 HC RX W HCPCS

## 2025-05-01 PROCEDURE — 7100000001 HC PACU RECOVERY - ADDTL 15 MIN: Performed by: ORTHOPAEDIC SURGERY

## 2025-05-01 PROCEDURE — 93010 ELECTROCARDIOGRAM REPORT: CPT | Performed by: INTERNAL MEDICINE

## 2025-05-01 PROCEDURE — 7100000011 HC PHASE II RECOVERY - ADDTL 15 MIN: Performed by: ORTHOPAEDIC SURGERY

## 2025-05-01 DEVICE — GRAFT BNE SUB SM 1ML CRYOPRESERVED VIABLE CORT CANC BNE: Type: IMPLANTABLE DEVICE | Site: ANKLE | Status: FUNCTIONAL

## 2025-05-01 DEVICE — IMPLANTABLE DEVICE: Type: IMPLANTABLE DEVICE | Site: ANKLE | Status: FUNCTIONAL

## 2025-05-01 DEVICE — SCREW BNE L50MM DIA3.5MM CORT S STL ST NONCANNULATED LOK: Type: IMPLANTABLE DEVICE | Site: ANKLE | Status: FUNCTIONAL

## 2025-05-01 DEVICE — SCREW BNE L40MM DIA3.5MM CORT S STL ST NONCANNULATED LOK: Type: IMPLANTABLE DEVICE | Site: ANKLE | Status: FUNCTIONAL

## 2025-05-01 DEVICE — 4CC SYRINGE OF BG PUTTY BIOACTIVE BONE GRAFT SUBSTITUTE
Type: IMPLANTABLE DEVICE | Site: ANKLE | Status: FUNCTIONAL
Brand: FIBERGRAFT BG PUTTY

## 2025-05-01 RX ORDER — DEXMEDETOMIDINE HYDROCHLORIDE 100 UG/ML
INJECTION, SOLUTION INTRAVENOUS
Status: DISCONTINUED | OUTPATIENT
Start: 2025-05-01 | End: 2025-05-01 | Stop reason: SDUPTHER

## 2025-05-01 RX ORDER — ONDANSETRON 2 MG/ML
INJECTION INTRAMUSCULAR; INTRAVENOUS
Status: DISCONTINUED | OUTPATIENT
Start: 2025-05-01 | End: 2025-05-01

## 2025-05-01 RX ORDER — SODIUM CHLORIDE 0.9 % (FLUSH) 0.9 %
5-40 SYRINGE (ML) INJECTION PRN
Status: DISCONTINUED | OUTPATIENT
Start: 2025-05-01 | End: 2025-05-01 | Stop reason: HOSPADM

## 2025-05-01 RX ORDER — ASPIRIN 81 MG/1
81 TABLET ORAL 2 TIMES DAILY
Qty: 60 TABLET | Refills: 0 | Status: SHIPPED | OUTPATIENT
Start: 2025-05-01

## 2025-05-01 RX ORDER — SODIUM CHLORIDE 9 MG/ML
INJECTION, SOLUTION INTRAVENOUS
Status: DISCONTINUED | OUTPATIENT
Start: 2025-05-01 | End: 2025-05-01 | Stop reason: SDUPTHER

## 2025-05-01 RX ORDER — SODIUM CHLORIDE 0.9 % (FLUSH) 0.9 %
5-40 SYRINGE (ML) INJECTION EVERY 12 HOURS SCHEDULED
Status: DISCONTINUED | OUTPATIENT
Start: 2025-05-01 | End: 2025-05-01 | Stop reason: HOSPADM

## 2025-05-01 RX ORDER — HYDROMORPHONE HYDROCHLORIDE 1 MG/ML
0.5 INJECTION, SOLUTION INTRAMUSCULAR; INTRAVENOUS; SUBCUTANEOUS EVERY 5 MIN PRN
Status: DISCONTINUED | OUTPATIENT
Start: 2025-05-01 | End: 2025-05-01 | Stop reason: HOSPADM

## 2025-05-01 RX ORDER — PROCHLORPERAZINE EDISYLATE 5 MG/ML
5 INJECTION INTRAMUSCULAR; INTRAVENOUS
Status: DISCONTINUED | OUTPATIENT
Start: 2025-05-01 | End: 2025-05-01 | Stop reason: HOSPADM

## 2025-05-01 RX ORDER — DEXAMETHASONE SODIUM PHOSPHATE 10 MG/ML
INJECTION, SOLUTION INTRAMUSCULAR; INTRAVENOUS
Status: DISCONTINUED | OUTPATIENT
Start: 2025-05-01 | End: 2025-05-01 | Stop reason: SDUPTHER

## 2025-05-01 RX ORDER — ROPIVACAINE HYDROCHLORIDE 5 MG/ML
40 INJECTION, SOLUTION EPIDURAL; INFILTRATION; PERINEURAL ONCE
Status: DISCONTINUED | OUTPATIENT
Start: 2025-05-01 | End: 2025-05-01 | Stop reason: HOSPADM

## 2025-05-01 RX ORDER — PROPOFOL 10 MG/ML
INJECTION, EMULSION INTRAVENOUS
Status: DISCONTINUED | OUTPATIENT
Start: 2025-05-01 | End: 2025-05-01 | Stop reason: SDUPTHER

## 2025-05-01 RX ORDER — SODIUM CHLORIDE 9 MG/ML
INJECTION, SOLUTION INTRAVENOUS PRN
Status: DISCONTINUED | OUTPATIENT
Start: 2025-05-01 | End: 2025-05-01 | Stop reason: HOSPADM

## 2025-05-01 RX ORDER — VANCOMYCIN HYDROCHLORIDE 1 G/20ML
INJECTION, POWDER, LYOPHILIZED, FOR SOLUTION INTRAVENOUS PRN
Status: DISCONTINUED | OUTPATIENT
Start: 2025-05-01 | End: 2025-05-01 | Stop reason: ALTCHOICE

## 2025-05-01 RX ORDER — HYDROMORPHONE HYDROCHLORIDE 1 MG/ML
0.25 INJECTION, SOLUTION INTRAMUSCULAR; INTRAVENOUS; SUBCUTANEOUS EVERY 5 MIN PRN
Status: DISCONTINUED | OUTPATIENT
Start: 2025-05-01 | End: 2025-05-01 | Stop reason: HOSPADM

## 2025-05-01 RX ORDER — GLYCOPYRROLATE 1 MG/5 ML
SYRINGE (ML) INTRAVENOUS
Status: DISCONTINUED | OUTPATIENT
Start: 2025-05-01 | End: 2025-05-01 | Stop reason: SDUPTHER

## 2025-05-01 RX ORDER — TOBRAMYCIN 1.2 G/30ML
INJECTION, POWDER, LYOPHILIZED, FOR SOLUTION INTRAVENOUS PRN
Status: DISCONTINUED | OUTPATIENT
Start: 2025-05-01 | End: 2025-05-01 | Stop reason: ALTCHOICE

## 2025-05-01 RX ORDER — OXYCODONE AND ACETAMINOPHEN 5; 325 MG/1; MG/1
1 TABLET ORAL EVERY 6 HOURS PRN
Qty: 28 TABLET | Refills: 0 | Status: SHIPPED | OUTPATIENT
Start: 2025-05-01 | End: 2025-05-08

## 2025-05-01 RX ORDER — ENOXAPARIN SODIUM 100 MG/ML
40 INJECTION SUBCUTANEOUS 2 TIMES DAILY
Qty: 24 ML | Refills: 1 | Status: SHIPPED | OUTPATIENT
Start: 2025-05-01

## 2025-05-01 RX ORDER — ROPIVACAINE HYDROCHLORIDE 5 MG/ML
INJECTION, SOLUTION EPIDURAL; INFILTRATION; PERINEURAL
Status: COMPLETED | OUTPATIENT
Start: 2025-05-01 | End: 2025-05-01

## 2025-05-01 RX ORDER — ENOXAPARIN SODIUM 100 MG/ML
40 INJECTION SUBCUTANEOUS ONCE
Status: DISCONTINUED | OUTPATIENT
Start: 2025-05-01 | End: 2025-05-01 | Stop reason: HOSPADM

## 2025-05-01 RX ORDER — NALOXONE HYDROCHLORIDE 0.4 MG/ML
INJECTION, SOLUTION INTRAMUSCULAR; INTRAVENOUS; SUBCUTANEOUS PRN
Status: DISCONTINUED | OUTPATIENT
Start: 2025-05-01 | End: 2025-05-01 | Stop reason: HOSPADM

## 2025-05-01 RX ORDER — FENTANYL CITRATE 50 UG/ML
INJECTION, SOLUTION INTRAMUSCULAR; INTRAVENOUS
Status: DISCONTINUED | OUTPATIENT
Start: 2025-05-01 | End: 2025-05-01 | Stop reason: SDUPTHER

## 2025-05-01 RX ORDER — FENTANYL CITRATE 50 UG/ML
100 INJECTION, SOLUTION INTRAMUSCULAR; INTRAVENOUS ONCE
Status: COMPLETED | OUTPATIENT
Start: 2025-05-01 | End: 2025-05-01

## 2025-05-01 RX ADMIN — Medication 25 MG: at 10:16

## 2025-05-01 RX ADMIN — FENTANYL CITRATE 50 MCG: 50 INJECTION, SOLUTION INTRAMUSCULAR; INTRAVENOUS at 10:43

## 2025-05-01 RX ADMIN — SODIUM CHLORIDE: 0.9 INJECTION, SOLUTION INTRAVENOUS at 08:27

## 2025-05-01 RX ADMIN — PROPOFOL 200 MG: 10 INJECTION, EMULSION INTRAVENOUS at 09:08

## 2025-05-01 RX ADMIN — DEXMEDETOMIDINE HCL 4 MCG: 100 INJECTION INTRAVENOUS at 10:04

## 2025-05-01 RX ADMIN — DEXMEDETOMIDINE HCL 8 MCG: 100 INJECTION INTRAVENOUS at 09:59

## 2025-05-01 RX ADMIN — CEFAZOLIN 2000 MG: 2 INJECTION, POWDER, FOR SOLUTION INTRAMUSCULAR; INTRAVENOUS at 09:15

## 2025-05-01 RX ADMIN — Medication 0.2 MG: at 09:08

## 2025-05-01 RX ADMIN — DEXAMETHASONE SODIUM PHOSPHATE 10 MG: 10 INJECTION, SOLUTION INTRAMUSCULAR; INTRAVENOUS at 09:08

## 2025-05-01 RX ADMIN — PHENYLEPHRINE HYDROCHLORIDE 200 MCG: 10 INJECTION INTRAVENOUS at 09:11

## 2025-05-01 RX ADMIN — PHENYLEPHRINE HYDROCHLORIDE 100 MCG: 10 INJECTION INTRAVENOUS at 10:58

## 2025-05-01 RX ADMIN — PROPOFOL 100 MG: 10 INJECTION, EMULSION INTRAVENOUS at 09:35

## 2025-05-01 RX ADMIN — Medication 30 MG: at 09:08

## 2025-05-01 RX ADMIN — FENTANYL CITRATE 100 MCG: 50 INJECTION INTRAMUSCULAR; INTRAVENOUS at 08:54

## 2025-05-01 RX ADMIN — ROPIVACAINE HYDROCHLORIDE 20 ML: 5 INJECTION, SOLUTION EPIDURAL; INFILTRATION; PERINEURAL at 08:55

## 2025-05-01 RX ADMIN — Medication 20 MG: at 09:35

## 2025-05-01 RX ADMIN — DEXMEDETOMIDINE HCL 4 MCG: 100 INJECTION INTRAVENOUS at 10:09

## 2025-05-01 RX ADMIN — ROPIVACAINE HYDROCHLORIDE 10 ML: 5 INJECTION EPIDURAL; INFILTRATION; PERINEURAL at 08:58

## 2025-05-01 RX ADMIN — SODIUM CHLORIDE: 9 INJECTION, SOLUTION INTRAVENOUS at 09:04

## 2025-05-01 ASSESSMENT — PAIN SCALES - GENERAL: PAINLEVEL_OUTOF10: 0

## 2025-05-01 ASSESSMENT — PAIN - FUNCTIONAL ASSESSMENT: PAIN_FUNCTIONAL_ASSESSMENT: 0-10

## 2025-05-01 ASSESSMENT — LIFESTYLE VARIABLES: SMOKING_STATUS: 1

## 2025-05-01 NOTE — H&P
Yuan Mclaughlin MD  Physician  Specialty: Orthopedic Surgery     Progress Notes     Signed     Encounter Date: 2/18/2025     Signed       Expand All Collapse All    Show:  []Written[]Templated[]Copied    Added by:  [x]Yuan Mclaughlin MD    []Real for details          Chief Complaint   Patient presents with    Follow-up       Patient follow up for CT review of left ankle. Pt ambulating without assist. Pt states intermittent pain.          SUBJECTIVE: Patient is a very pleasant 39-year-old female comes in today for evaluation of left ankle pain recent CT performed to evaluate her left ankle.  The CT showed significant posttraumatic arthritis of her left ankle with what appears to be healed pilon and fibular shaft fractures.  She has been having issues since 2016 when she had original tibial pilon fracture.  She has had bouts of regional pain syndrome which have resolved.  She currently would like to go forward with tibial hardware removal and ankle fusion if possible.  She states the pain is gone the point that her AFO brace is not helping.  She is having difficulty getting through her entire work shift at a [employer].  She went to talk about the ankle fusion in detail.  We did talk about that in detail.  I did explain the ankle fusions are normally very successful although if the fusion does not take they can be another source of pain and require multiple surgeries.  I also explained that the risk of death of anesthesia, infection, neurovascular damage, reflaring of her complex regional pain syndrome, chronic pain, deep venous thrombosis, and any other unforeseeable complications.  I also explained that we completely fuse the tibiotalar joint therefore there is no motion in that joint I showed her the motion today.  She would like to proceed with workup and getting ready to plan a left ankle fusion of the tibiotalar joint with hardware removal.              Past Medical History        Past

## 2025-05-01 NOTE — OP NOTE
Operative Note      Patient: Sheri Gallegos  YOB: 1985  MRN: [id number]    Date of Procedure: 5/1/2025    Pre-Op Diagnosis Codes:      * Traumatic osteoarthritis of left ankle [M19.172]     * Pain due to internal orthopedic prosthetic device [T84.84XA]     * Presence of other orthopedic joint implants [Z96.698]     * Chronic pain of left ankle [M25.572, G89.29]    Post-Op Diagnosis: Same       Procedure(s):  REMOVAL OF ORTHOPEDIC HARDWARE FROM LEFT ANKLE, TIBIOTALAR JOINT FUSION OF LEFT ANKLE, open    Surgeon(s):  Yuan Mclaughlin MD    Assistant:   Resident: Amaury Lundberg DO; Blu Abdi DO    Anesthesia: General    Estimated Blood Loss (mL): less than 50     Complications: None    Specimens:   * No specimens in log *    Implants:  Implant Name Type Inv. Item Serial No.  Lot No. LRB No. Used Action   GRAFT BNE SUB SM 1ML CRYOPRESERVED VIABLE ALTAGRACIA CANC BNE - [id number]  GRAFT BNE SUB SM 1ML CRYOPRESERVED VIABLE ALTAGRACIA CANC BNE [id number] [facility] T[facility]UE [facility]-WD  Left 1 Implanted   GRAFT BONE SUBSTITUTE FIBERGRAFT 4CC - [id number]  GRAFT BONE SUBSTITUTE FIBERGRAFT 4CC  [facility] [id number] Left 1 Implanted   SCREW BNE HDLSS SHT THRD 6.5X50 MM 16 MM NILES ST SD PUR NS - [id number]  SCREW BNE HDLSS SHT THRD 6.5X50 MM 16 MM NILES ST SD PUR NS  [employer]-WD  Left 2 Implanted   SCREW BNE HDLSS SHT THRD 6.5X60 MM 16 MM NILES ST SD PUR NS - [id number]  SCREW BNE HDLSS SHT THRD 6.5X60 MM 16 MM NILES ST SD PUR NS  [employer]-WD  Left 1 Implanted   SCREW BNE HDLSS LNG THRD 6.5X65 MM 32 MM NILES ST SD PUR NS - [id number]  SCREW BNE HDLSS LNG THRD 6.5X65 MM 32 MM NILES ST SD PUR NS  [employer]-WD  Left 1 Implanted   SCREW BNE L50MM DIA3.5MM ALTAGRACIA S STL ST NONCANNULATED JULIETA - [id number]  SCREW BNE L50MM DIA3.5MM ALTAGRACIA S STL ST NONCANNULATED JULIETA  [employer]-WD  Left 1 Implanted   SCREW BNE L40MM DIA3.5MM ALTAGRACIA S STL ST NONCANNULATED JULIETA - [id number]  SCREW  BNE L40MM DIA3.5MM ALTAGRACIA S STL ST NONCANNULATED JULIETA  [employer] [place]-WD  Left 1 Implanted         Drains: * No LDAs found *    Findings:  Infection Present At Time Of Surgery (PATOS) (choose all levels that have infection present):  No infection present  Other Findings: Utilize [employer] 6.5 mm headless compression screws for compression through joint as well as Vivigen and fiber graft and autograft    Detailed Description of Procedure:   Patient was brought to the operating room in a supine position on hospital bed.  Patient was transferred to the operating room table by multiple individuals in safe fashion with anesthesia and controlled patient C-spine area.  Once the operative table points pressure identified well-padded.  Tourniquet applied to her left upper thigh.  The left lower extremity sterilely prepped and draped in the standard orthopedic fashion.  Timeouts performed indicating the appropriate application of the patient, the procedure to be performed, and side to be performed upon.  This agreed upon by individuals in the room.  We then visualized her previous approaches which 1 was an anterior medial approach which was marked out and 1 was a lateral approach over the fibula which was also marked out.  Esmarch applied tourniquet is elevated to 250 mmHg.  We initially started with a lateral approach the fibula was exposed to a subcutaneous approach.  The previous plate was removed.  We also dissected over the top and onto the lateral tibia were able to take down the screws from the shaft of the anterior lateral [employer]s previous pilon plate.  Along with some of the distal locking screws.  At this point time I provided a fibular osteotomy and osteotomized off the medial half of the fibula for autograft.  We also started to prep her the joint from the lateral side.  We then moved to the medial approach was made with a 10 blade and careful scissor dissection was To dissect just medial to the tibialis anterior

## 2025-05-01 NOTE — ANESTHESIA PROCEDURE NOTES
Peripheral Block    Patient location during procedure: pre-op  Reason for block: post-op pain management and at surgeon's request  Start time: 5/1/2025 8:55 AM  End time: 5/1/2025 8:57 AM  Staffing  Performed: anesthesiologist   Anesthesiologist: Vesna Lorenzana MD  Performed by: Vesna Lorenzana MD  Authorized by: Vesna Lorenzana MD    Preanesthetic Checklist  Completed: patient identified, IV checked, site marked, risks and benefits discussed, surgical/procedural consents, equipment checked, pre-op evaluation, timeout performed, anesthesia consent given, oxygen available and monitors applied/VS acknowledged  Peripheral Block   Patient position: supine  Prep: ChloraPrep  Provider prep: mask and sterile gloves  Patient monitoring: cardiac monitor, continuous pulse ox, frequent blood pressure checks and IV access  Block type: Sciatic  Popliteal  Laterality: left  Injection technique: single-shot  Guidance: ultrasound guided  Local infiltration: lidocaine  Infiltration strength: 1 %  Local infiltration: lidocaine    Needle   Needle type: insulated echogenic nerve stimulator needle   Needle gauge: 20 G  Needle localization: ultrasound guidance  Needle length: 10 cm  Assessment   Injection assessment: negative aspiration for heme, no paresthesia on injection and local visualized surrounding nerve on ultrasound  Slow fractionated injection: yes  Hemodynamics: stable  Outcomes: uncomplicated and patient tolerated procedure well    Medications Administered  ropivacaine (NAROPIN) injection 0.5% - Perineural   20 mL - 5/1/2025 8:55:00 AM

## 2025-05-01 NOTE — PROGRESS NOTES
Patient came to surgical suite with eyeglasses which were placed in a labeled bag and will accompany the patient to recovery.    CONNIE Pittman RN

## 2025-05-01 NOTE — ANESTHESIA PROCEDURE NOTES
Peripheral Block    Patient location during procedure: pre-op  Reason for block: post-op pain management and at surgeon's request  Start time: 5/1/2025 8:58 AM  End time: 5/1/2025 8:59 AM  Staffing  Performed: anesthesiologist   Anesthesiologist: Vesna Lorenzana MD  Performed by: Vesna Lorenzana MD  Authorized by: Vesna Lorenzana MD    Preanesthetic Checklist  Completed: patient identified, IV checked, site marked, risks and benefits discussed, surgical/procedural consents, equipment checked, pre-op evaluation, timeout performed, anesthesia consent given, oxygen available and monitors applied/VS acknowledged  Peripheral Block   Patient position: supine  Prep: ChloraPrep  Provider prep: mask and sterile gloves  Patient monitoring: cardiac monitor, continuous pulse ox, frequent blood pressure checks and IV access  Block type: Femoral  Adductor canal  Laterality: left  Injection technique: single-shot  Guidance: ultrasound guided  Local infiltration: lidocaine  Infiltration strength: 1 %  Local infiltration: lidocaine    Needle   Needle type: insulated echogenic nerve stimulator needle   Needle gauge: 20 G  Needle localization: ultrasound guidance  Needle length: 10 cm  Assessment   Injection assessment: negative aspiration for heme, no paresthesia on injection and local visualized surrounding nerve on ultrasound  Slow fractionated injection: yes  Hemodynamics: stable  Outcomes: uncomplicated and patient tolerated procedure well    Medications Administered  ropivacaine (NAROPIN) injection 0.5% - Perineural   10 mL - 5/1/2025 8:58:00 AM

## 2025-05-01 NOTE — ANESTHESIA PRE PROCEDURE
Department of Anesthesiology  Preprocedure Note       Name:  Sheri Glalegos   Age:  39 y.o.  :  1985                                          MRN:  [id number]         Date:  2025      Surgeon: Surgeon(s):  Yuan Mclaughlin MD    Procedure: Procedure(s):  REMOVAL OF ORTHOPEDIC HARDWARE FROM LEFT ANKLE, TIBIOTALAR JOINT FUSION OF LEFT ANKLE **BLCK NEEDED**    Medications prior to admission:   Prior to Admission medications    Medication Sig Start Date End Date Taking? Authorizing Provider   methocarbamol (ROBAXIN) 750 MG tablet Take 1 tablet by mouth nightly   Yes ProviderDisha MD   metoprolol tartrate (LOPRESSOR) 25 MG tablet TAKE 1 TABLET BY MOUTH 2 TIMES DAILY AS NEEDED (TACHYCARDIA) IF BASELINE METOPROLOL DOES NOT PREVENT TACHYCARDIA 22  Yes Laverne Aleman MD   aspirin 81 MG EC tablet Take 1 tablet by mouth daily    ProviderDisha MD       Current medications:    Current Facility-Administered Medications   Medication Dose Route Frequency Provider Last Rate Last Admin   • fentaNYL (SUBLIMAZE) injection 100 mcg  100 mcg IntraVENous Once Vesna Lorenzana MD       • ROPivacaine (NAROPIN) 0.5% injection 40 mL  40 mL Infiltration Once Vesna Lorenzana MD       • sodium chloride flush 0.9 % injection 5-40 mL  5-40 mL IntraVENous 2 times per day Price Mercado PA       • sodium chloride flush 0.9 % injection 5-40 mL  5-40 mL IntraVENous PRN Price Mercado PA       • 0.9 % sodium chloride infusion   IntraVENous PRN Price Mercado PA 10 mL/hr at 25 New Bag at 25 08   • ceFAZolin (ANCEF) 2,000 mg in sterile water 20 mL IV syringe  2,000 mg IntraVENous On Call to OR Price Mercado PA           Allergies:    Allergies   Allergen Reactions   • Ativan [Lorazepam] Other (See Comments)     \"redness and puffy eyes\"  Per pt.    • Buspar [Buspirone] Other (See Comments)     Lips numb     • Ciprofloxacin Swelling   • Fish-Derived Products      Seafood

## 2025-05-01 NOTE — DISCHARGE INSTRUCTIONS
[facility] Department of Orthopedic Surgery  [address] [facility] [address]    Dr. Valeriano Weaver, DO         MD Dr. Valeriano Rivera MD Frank Ansevin, PA-C Sara Zatchok, PA-C Tyler Tsangaris PA-C      Orthopaedics Discharge Instructions   Weight bearing Status - Non-weight bearing - on left lower Extremity  Pain Medication   Contact Office for Medication Refill- [phone]  Office can refill pain medications no sooner than every 7 days  If patient discharging to facility then pain control will be continued per facility physician  Ice to operative/injured site for 15-30 minutes of each hour for next 5 days    Recommend that you continue to ice the area 2-3 times per day after this   Elevate operative/injured limb on 2 pillows at home  Goal is to have limb above the heart if able  Continue DVT Prophylaxis (blood clot prevention) as prescribed: ***   Wound care - ***  Fracture Care -  ***    Follow up in office in approximately 2 weeks. Your first follow up appointment is often with one of our physician assistants.     Call the office at [phone] if having any concerns.     Watch for these significant complications.  Call physician if they or any other problems occur:  Fever over 101°, redness, swelling or warmth at the operative site  Unrelieved nausea    Foul smelling or cloudy drainage at the operative site   Unrelieved pain    Blood soaked dressing. (Some oozing may be normal)     Numb, pale, blue, cold or tingling extremity          It is the Department of Orthopaedic Trauma's standard of practice that providers will de-escalate (wean) all patients from narcotic (opioid) medications during the post-operative period.   We provide multimodal pain control, but opioid medications are tapered in all of our patients.  If patient requires referral to pain management for prolonged taper from opioid pain medication, we will facilitate this process.        Weight bearing is an

## 2025-05-01 NOTE — ANESTHESIA POSTPROCEDURE EVALUATION
Department of Anesthesiology  Postprocedure Note    Patient: Sheri Gallegos  MRN: [id number]  YOB: 1985  Date of evaluation: 5/1/2025    Procedure Summary       Date: 05/01/25 Room / Location: [address]    Anesthesia Start: 0904 Anesthesia Stop: 1148    Procedure: REMOVAL OF ORTHOPEDIC HARDWARE FROM LEFT ANKLE, TIBIOTALAR JOINT FUSION OF LEFT ANKLE (Left: Ankle) Diagnosis:       Traumatic osteoarthritis of left ankle      Pain due to internal orthopedic prosthetic device      Presence of other orthopedic joint implants      Chronic pain of left ankle      (Traumatic osteoarthritis of left ankle [M19.172])      (Pain due to internal orthopedic prosthetic device [T84.84XA])      (Presence of other orthopedic joint implants [Z96.698])      (Chronic pain of left ankle [M25.572, G89.29])    Surgeons: Yuan Mclaughlin MD Responsible Provider: Vesna Lorenzana MD    Anesthesia Type: General, Regional ASA Status: 3            Anesthesia Type: General, Regional    Arielle Phase I: Arielle Score: 10    Arielle Phase II:      Anesthesia Post Evaluation    Patient location during evaluation: PACU  Patient participation: complete - patient participated  Level of consciousness: awake  Airway patency: patent  Nausea & Vomiting: no nausea and no vomiting  Cardiovascular status: hemodynamically stable  Respiratory status: acceptable  Hydration status: euvolemic  Pain management: adequate    No notable events documented.

## 2025-05-02 ENCOUNTER — TELEPHONE (OUTPATIENT)
Dept: ORTHOPEDIC SURGERY | Age: 40
End: 2025-05-02

## 2025-05-02 NOTE — TELEPHONE ENCOUNTER
Patient called  on VM, she was given Rx for Lovenox 40 mg injectio bid x 28 days after surgery yesterday by Dr Mclaughlin.   [facility] [place] informed her they have been unable to obtain Lovenox, on back order, not sure when they will get it in.      Inga PALACIOS, phoned [facility] [place] and they also did not have Lovenox available.  Inga called [facility] back and after searching pharmacist found 5 days worth of Lovenox.  Jerzy Morin will fill 5 days worth of Rx and owe patient 23 days when shipment arrives, they will notify patient when received.  Inga was assured by pharmacist that they would be getting Lovenox in stock and patient will not have lapse in treatment.       Informed patient of above.  She will  5 days worth of Rx today.    Per Dr Mclaughlin, patient instructed not to take ASA 81 mg bid

## 2025-05-05 ENCOUNTER — TELEPHONE (OUTPATIENT)
Dept: ORTHOPEDIC SURGERY | Age: 40
End: 2025-05-05

## 2025-05-05 NOTE — TELEPHONE ENCOUNTER
Spoke to pt. She will come to [facility] tomorrow.    Future Appointments   Date Time Provider Department Center   5/6/2025 10:30 AM Yuan Mclaughlin MD [facility]   5/19/2025 10:45 AM SCHEDULE,  [facility]

## 2025-05-05 NOTE — TELEPHONE ENCOUNTER
Pt called. States she fell while using crutches Saturday. States she thinks she may have put some wt on her left leg while trying to keep from falling.  States she is keeping leg elevated and using ice. Says she had some bleeding \"through the cast\", but has had no further bleeding. She is asking if she should go get xrays or come in to be seen.

## 2025-05-06 ENCOUNTER — OFFICE VISIT (OUTPATIENT)
Dept: ORTHOPEDIC SURGERY | Age: 40
End: 2025-05-06

## 2025-05-06 VITALS
WEIGHT: 125 LBS | TEMPERATURE: 97.9 F | HEART RATE: 82 BPM | OXYGEN SATURATION: 96 % | DIASTOLIC BLOOD PRESSURE: 72 MMHG | BODY MASS INDEX: 23.6 KG/M2 | SYSTOLIC BLOOD PRESSURE: 109 MMHG | RESPIRATION RATE: 18 BRPM | HEIGHT: 61 IN

## 2025-05-06 DIAGNOSIS — T84.84XA PAINFUL ORTHOPAEDIC HARDWARE: Primary | ICD-10-CM

## 2025-05-06 PROCEDURE — 99024 POSTOP FOLLOW-UP VISIT: CPT | Performed by: PHYSICIAN ASSISTANT

## 2025-05-06 RX ORDER — OXYCODONE AND ACETAMINOPHEN 5; 325 MG/1; MG/1
1 TABLET ORAL EVERY 6 HOURS PRN
Qty: 28 TABLET | Refills: 0 | Status: SHIPPED | OUTPATIENT
Start: 2025-05-06 | End: 2025-05-13

## 2025-05-06 NOTE — PATIENT INSTRUCTIONS
Nonweightbearing left lower extremity.    Patient was placed into a left lower extremity boot.  Okay to remove boot for hygiene and gentle range of motion exercises of the ankle/foot.    Lovenox for DVT prophylaxis.    Follow-up at her regularly scheduled postop visit in a couple weeks    Call if any questions or concerns

## 2025-05-06 NOTE — PROGRESS NOTES
pedis and posterior tibialis pulses, cap refill brisk in toes, foot warm/perfused.    /72   Pulse 82   Temp 97.9 °F (36.6 °C)   Resp 18   Ht 1.549 m (5' 1\")   Wt 56.7 kg (125 lb)   LMP 02/10/2016 (Approximate)   SpO2 96%   BMI 23.62 kg/m²     XR:   3 views left ankle demonstrate s/p left ankle tibiotalar joint fusion with hardware in stable position and alignment.  No evidence of hardware loosening or failure.    Assessment:   Diagnosis Orders   1. Painful orthopaedic hardware  XR ANKLE LEFT (MIN 3 VIEWS)        Plan:  Xrays reviewed with patient. Plan of care discussed in detail, all questions sought and answered to patients satisfaction at this time.  Patient reviewed and discussed with Dr. Mclaughlin.    Nonweightbearing left lower extremity.    Patient was placed into a left lower extremity boot.  Okay to remove boot for hygiene and gentle range of motion exercises of the ankle/foot.    Lovenox for DVT prophylaxis.     Follow-up at her regularly scheduled postop visit in a couple weeks for reevaluation, x-rays and possible suture removal    Call if any questions or concerns    Electronically signed by TOSHA Marie on 5/6/2025 at 11:26 AM  Note: This report was completed using computerVinfolio voiced recognition software.  Every effort has been made to ensure accuracy; however, inadvertent computerized transcription errors may be present.

## 2025-05-07 ENCOUNTER — HOSPITAL ENCOUNTER (EMERGENCY)
Age: 40
Discharge: HOME | End: 2025-05-07
Payer: COMMERCIAL

## 2025-05-07 VITALS
WEIGHT: 125 LBS | BODY MASS INDEX: 23.62 KG/M2 | OXYGEN SATURATION: 98 % | TEMPERATURE: 98.3 F | SYSTOLIC BLOOD PRESSURE: 143 MMHG | HEART RATE: 93 BPM | RESPIRATION RATE: 18 BRPM | DIASTOLIC BLOOD PRESSURE: 77 MMHG

## 2025-05-07 DIAGNOSIS — M25.572 LEFT ANKLE PAIN, UNSPECIFIED CHRONICITY: ICD-10-CM

## 2025-05-07 DIAGNOSIS — M79.662 PAIN IN LEFT LOWER LEG: ICD-10-CM

## 2025-05-07 DIAGNOSIS — Z98.890 STATUS POST OSTEOTOMY: Primary | ICD-10-CM

## 2025-05-07 PROCEDURE — 6370000000 HC RX 637 (ALT 250 FOR IP)

## 2025-05-07 PROCEDURE — 99283 EMERGENCY DEPT VISIT LOW MDM: CPT

## 2025-05-07 RX ORDER — OXYCODONE AND ACETAMINOPHEN 5; 325 MG/1; MG/1
1 TABLET ORAL ONCE
Refills: 0 | Status: COMPLETED | OUTPATIENT
Start: 2025-05-07 | End: 2025-05-07

## 2025-05-07 RX ADMIN — OXYCODONE AND ACETAMINOPHEN 1 TABLET: 5; 325 TABLET ORAL at 13:28

## 2025-05-07 ASSESSMENT — ENCOUNTER SYMPTOMS
EYES NEGATIVE: 1
ALLERGIC/IMMUNOLOGIC NEGATIVE: 1
RESPIRATORY NEGATIVE: 1
GASTROINTESTINAL NEGATIVE: 1

## 2025-05-07 ASSESSMENT — PAIN SCALES - GENERAL: PAINLEVEL_OUTOF10: 10

## 2025-05-07 NOTE — ED PROVIDER NOTES
[facility] EMERGENCY DEPARTMENT  EMERGENCY DEPARTMENT ENCOUNTER        Pt Name: Sheri Gallegos  MRN: [id number]  Birthdate 1985  Date of evaluation: 5/7/2025  Provider: JAYY Mccormack CNP  PCP: Geronimo Tamayo DO  Note Started: 1:18 PM EDT 5/7/25      CHRISSIE. I have evaluated this patient.        CHIEF COMPLAINT       Chief Complaint   Patient presents with    Leg Injury     Left leg pain from a previous surgery that was done 1 week ago. Pt has fallen twice since the operation.        HISTORY OF PRESENT ILLNESS: 1 or more Elements     History from : Patient    Limitations to history : None    Sheri Gallegos is a 39 y.o. female who presents to the ED for with concerns of a left fibula fracture.  Patient states she had surgery on 5/1 to remove hardware from a previous surgery.  Patient states she had frequent falls since the surgery, due to trying to use crutches, and was seen at orthopedics yesterday where she had an x-ray obtained.  Patient states she saw the images today and thought she had broken her bone so she contacted her orthopedic doctor.  Patient states she had not heard back from them so she came to the ED. patient arrived to the ED wearing a walking boot.  While reviewing the chart, I saw that the orthopedics office had messaged the patient back stating that this was a normal finding due to the fact that a fibular osteotomy was performed on 5/1.  Patient denies any numbness, tingling, weakness.  Patient denies any falls or injuries since the x-ray was obtained yesterday.  Patient states she is out of her Percocet and is unable to refill it until tomorrow.  Patient denies any chest pain, shortness of breath, abdominal pain, nausea, vomiting, diarrhea, headache, lightheadedness, dizziness, fever, chills, body aches.  Patient denies any other symptoms.  Patient has no other complaints at this time.    Nursing Notes were all reviewed and agreed with or any

## 2025-05-15 DIAGNOSIS — Z98.1 S/P ANKLE FUSION: Primary | ICD-10-CM

## 2025-05-15 RX ORDER — OXYCODONE AND ACETAMINOPHEN 10; 325 MG/1; MG/1
1 TABLET ORAL EVERY 6 HOURS PRN
Qty: 28 TABLET | Refills: 0 | Status: SHIPPED | OUTPATIENT
Start: 2025-05-15 | End: 2025-05-22

## 2025-05-15 NOTE — TELEPHONE ENCOUNTER
Percocet refilled    Controlled Substance Monitoring:    Acute and Chronic Pain Monitoring:   RX Monitoring Periodic Controlled Substance Monitoring   5/15/2025  11:54 AM No signs of potential drug abuse or diversion identified.

## 2025-05-15 NOTE — TELEPHONE ENCOUNTER
Received call from patient requesting refill of Percocet 5/325 mg.    Date of Procedure: 5/1/2025     Procedure(s):  REMOVAL OF ORTHOPEDIC HARDWARE FROM LEFT ANKLE, TIBIOTALAR JOINT FUSION OF LEFT ANKLE, open     Surgeon(s):  Yuan Mclaughlin MD    Weeks from Surgery: 2      Medication pended and routed to providers for decision and signature.    Future Appointments   Date Time Provider Department Center   5/19/2025 10:45 AM SCHEDULE, [facility]       Electronically signed by Jody Nolen RN on 5/15/2025 at 11:17 AM

## 2025-05-19 ENCOUNTER — OFFICE VISIT (OUTPATIENT)
Age: 40
End: 2025-05-19
Payer: COMMERCIAL

## 2025-05-19 ENCOUNTER — HOSPITAL ENCOUNTER (OUTPATIENT)
Dept: GENERAL RADIOLOGY | Age: 40
Discharge: HOME | End: 2025-05-21
Payer: COMMERCIAL

## 2025-05-19 VITALS
TEMPERATURE: 98.4 F | DIASTOLIC BLOOD PRESSURE: 58 MMHG | OXYGEN SATURATION: 93 % | SYSTOLIC BLOOD PRESSURE: 92 MMHG | HEART RATE: 68 BPM | RESPIRATION RATE: 20 BRPM

## 2025-05-19 DIAGNOSIS — Z98.890 HISTORY OF REMOVAL OF RETAINED HARDWARE: ICD-10-CM

## 2025-05-19 DIAGNOSIS — R11.2 POST-OPERATIVE NAUSEA AND VOMITING: ICD-10-CM

## 2025-05-19 DIAGNOSIS — Z98.1 S/P ANKLE FUSION: ICD-10-CM

## 2025-05-19 DIAGNOSIS — Z98.1 S/P ANKLE ARTHRODESIS: Primary | ICD-10-CM

## 2025-05-19 DIAGNOSIS — Z98.890 POST-OPERATIVE NAUSEA AND VOMITING: ICD-10-CM

## 2025-05-19 PROCEDURE — 99024 POSTOP FOLLOW-UP VISIT: CPT

## 2025-05-19 PROCEDURE — 73610 X-RAY EXAM OF ANKLE: CPT

## 2025-05-19 RX ORDER — PROMETHAZINE HYDROCHLORIDE 12.5 MG/1
12.5 TABLET ORAL 3 TIMES DAILY PRN
Qty: 12 TABLET | Refills: 0 | Status: SHIPPED | OUTPATIENT
Start: 2025-05-19 | End: 2025-05-26

## 2025-05-19 RX ORDER — METHOCARBAMOL 750 MG/1
750 TABLET, FILM COATED ORAL 3 TIMES DAILY
Qty: 90 TABLET | Refills: 0 | Status: SHIPPED | OUTPATIENT
Start: 2025-05-19 | End: 2025-06-18

## 2025-05-19 NOTE — PROGRESS NOTES
Chief Complaint   Patient presents with    Follow-up      2wk p/o CLAYTON Left Ankle with tibiotalar joint fusion 5/1/25; TTS (*GP 7/30/25). Patient states pain lvl 6         OP:SURGEON: Dr. Yuan Mclaughlin MD  DATE OF PROCEDURE: 5/1/2025  PROCEDURE:REMOVAL OF ORTHOPEDIC HARDWARE FROM LEFT ANKLE, TIBIOTALAR JOINT FUSION OF LEFT ANKLE, open     Subjective:  Sheri Gallegos is approximately 2.5 weeks follow-up from the above surgery. She is doing okay. She has remained NWB to LLE in walking boot. Patient reports pain to left ankle which is somewhat tolerable with current pain medications. Patient c/o N/V with use of pain medications. Denies calf pain, CP, SOB, fever, chills, myalgias.    Review of Systems -  all pertinent positives and negatives in HPI.      Objective:    General: Alert and oriented X 3, normocephalic atraumatic, external ears and eye normal, sclera clear, no acute distress, respirations easy and unlabored with no audible wheezes, skin warm and dry, speech and dress appropriate for noted age, affect euthymic.    Extremity:  Left Lower Extremity  Skin clean dry and intact, without signs of infection  Incisions well approximated without signs of redness, warmth or drainage- sutures intact  Small scabbed area 1cm x 1cm along distal lateral ankle  Mild-moderate edema noted  Compartments supple throughout thigh and leg  Calf supple and nontender  Demonstrates active knee flexion/extension, great toe extension.   States sensation intact to touch in sural/deep peroneal/superficial peroneal/saphenous/posterior tibial nerve distributions to foot/ankle.   Palpable dorsalis pedis and posterior tibialis pulses, cap refill brisk in toes, foot warm/perfused.        Vitals:    05/19/25 1049 05/19/25 1115   BP: (!) 88/64 (!) 92/58   BP Site: Left Upper Arm    Patient Position: Sitting    BP Cuff Size: Medium Adult    Pulse: 68    Resp: 20    Temp: 98.4 °F (36.9 °C)    TempSrc: Temporal    SpO2: 93%          XR:   3 views

## 2025-05-19 NOTE — PATIENT INSTRUCTIONS
Continue lovenox daily    Robaxin sent to pharmacy    Phenergan sent to pharmacy *DO NOT take concurrently with pain medications    Call for refill of percocet when needed    Okay to remove boot throughout the day for gentle range of motion of ankle    Continue to ice and elevate     Incisional Care: sutures removed today in office. Steri strips placed. Steri strips can be removed in 7-10 days if they do not fall off sooner. Continue to monitor for signs or symptoms of infection until skin has completely healed. Okay to shower. No scrubbing near incision until skin has completely healed. Thoroughly pat dry with clean towel.

## 2025-05-23 DIAGNOSIS — T84.84XA PAINFUL ORTHOPAEDIC HARDWARE: ICD-10-CM

## 2025-05-23 RX ORDER — OXYCODONE AND ACETAMINOPHEN 5; 325 MG/1; MG/1
1 TABLET ORAL EVERY 8 HOURS PRN
Qty: 21 TABLET | Refills: 0 | Status: SHIPPED | OUTPATIENT
Start: 2025-05-23 | End: 2025-05-30 | Stop reason: SDUPTHER

## 2025-05-23 NOTE — TELEPHONE ENCOUNTER
Received call from patient requesting refill of Percocet 5/325 mg.    Date of Procedure: 5/1/2025     Procedure(s):  REMOVAL OF ORTHOPEDIC HARDWARE FROM LEFT ANKLE, TIBIOTALAR JOINT FUSION OF LEFT ANKLE, open     Surgeon(s):  Yuan Mclaughlin MD    Weeks from Surgery: 3    Last OV: 5/19/25    Medication pended and routed to providers for decision and signature.    Future Appointments   Date Time Provider Department Center   6/18/2025 10:30 AM SCHEDULE, [facility]       Electronically signed by Jody Nolen RN on 5/23/2025 at 1:13 PM

## 2025-05-23 NOTE — TELEPHONE ENCOUNTER
Sheri Gallegos    Weaned to 5/325 and Q8 hours    OARRS report reviewed  Last RX filled on 5/15/25      Controlled Substance Monitoring:    Acute and Chronic Pain Monitoring:   RX Monitoring Periodic Controlled Substance Monitoring   5/23/2025   1:17 PM No signs of potential drug abuse or diversion identified.        Electronically signed by Jody Bonilla PA-C on 5/23/2025 at 1:18 PM

## 2025-05-30 DIAGNOSIS — T84.84XA PAINFUL ORTHOPAEDIC HARDWARE: ICD-10-CM

## 2025-05-30 RX ORDER — OXYCODONE AND ACETAMINOPHEN 5; 325 MG/1; MG/1
1 TABLET ORAL EVERY 8 HOURS PRN
Qty: 21 TABLET | Refills: 0 | Status: SHIPPED | OUTPATIENT
Start: 2025-05-30 | End: 2025-06-06

## 2025-05-30 NOTE — TELEPHONE ENCOUNTER
Percocet refilled      Controlled Substance Monitoring:    Acute and Chronic Pain Monitoring:   RX Monitoring Periodic Controlled Substance Monitoring   5/30/2025  10:50 AM No signs of potential drug abuse or diversion identified.

## 2025-05-30 NOTE — TELEPHONE ENCOUNTER
Pharmacy interface requesting refill of Percocet 5/325 mg.    Last office visit: 5/19/25    Future Appointments   Date Time Provider Department Center   6/18/2025 10:30 AM SCHEDULE, [facility]

## 2025-05-31 PROBLEM — Z01.812 PRE-OPERATIVE LABORATORY EXAMINATION: Status: RESOLVED | Noted: 2025-05-01 | Resolved: 2025-05-31

## 2025-06-02 ENCOUNTER — OFFICE VISIT (OUTPATIENT)
Age: 40
End: 2025-06-02

## 2025-06-02 ENCOUNTER — HOSPITAL ENCOUNTER (OUTPATIENT)
Dept: ULTRASOUND IMAGING | Age: 40
Discharge: HOME | End: 2025-06-04
Payer: COMMERCIAL

## 2025-06-02 VITALS
OXYGEN SATURATION: 98 % | DIASTOLIC BLOOD PRESSURE: 87 MMHG | HEART RATE: 101 BPM | TEMPERATURE: 97.5 F | SYSTOLIC BLOOD PRESSURE: 128 MMHG

## 2025-06-02 DIAGNOSIS — Z98.1 S/P ANKLE ARTHRODESIS: ICD-10-CM

## 2025-06-02 DIAGNOSIS — M79.662 PAIN OF LEFT CALF: ICD-10-CM

## 2025-06-02 DIAGNOSIS — Z98.1 S/P ANKLE ARTHRODESIS: Primary | ICD-10-CM

## 2025-06-02 PROCEDURE — 99024 POSTOP FOLLOW-UP VISIT: CPT | Performed by: PHYSICIAN ASSISTANT

## 2025-06-02 PROCEDURE — 93971 EXTREMITY STUDY: CPT

## 2025-06-02 RX ORDER — SULFAMETHOXAZOLE AND TRIMETHOPRIM 800; 160 MG/1; MG/1
1 TABLET ORAL 2 TIMES DAILY
Qty: 20 TABLET | Refills: 0 | Status: SHIPPED | OUTPATIENT
Start: 2025-06-02

## 2025-06-02 RX ORDER — PROMETHAZINE HYDROCHLORIDE 12.5 MG/1
12.5 TABLET ORAL 3 TIMES DAILY PRN
Qty: 12 TABLET | Refills: 0 | Status: SHIPPED | OUTPATIENT
Start: 2025-06-02 | End: 2025-06-09

## 2025-06-02 NOTE — PROGRESS NOTES
Chief Complaint   Patient presents with    Post-Op Check     4 wk PO CLAYTON L ankle with tibiotalar joint fusion DOS: 05/01/2025. Increasing swelling, pain, fever/chills, N/V x1 week. The patient ambulates in wheelchair with CAM boot.        OP:SURGEON: Dr. Yuan Mclaughlin MD  DATE OF PROCEDURE: 5/1/2025  PROCEDURE:REMOVAL OF ORTHOPEDIC HARDWARE FROM LEFT ANKLE, TIBIOTALAR JOINT FUSION OF LEFT ANKLE, open     Subjective:  Sheri Gallegos is approximately 1 mo from surgery. NWB L LE with CAM boot. Taking lovenox. States she was taking phenergan, but ran out, so now having more nausea. States she felt feverish intermittently. States she has calf pain, but no CP or SOB. Taking lovenox for DVT ppx.     Review of Systems -  all pertinent positives and negatives in HPI.      Objective:    General: Alert and oriented X 3, normocephalic atraumatic, external ears and eye normal, sclera clear, no acute distress, respirations easy and unlabored with no audible wheezes, skin warm and dry, speech and dress appropriate for noted age, affect euthymic.    Extremity:  Left Lower Extremity  Skin clean dry and intact, without signs of infection  Incisions well approximated and healing. Dependent rubor, no drainage. No warmth.   Compartments supple throughout thigh and leg  Calf supple, but tender to squeeze  Demonstrates active knee flexion/extension, ankle plantar/dorsiflexion/great toe extension.   States sensation intact to touch in sural/deep peroneal/superficial peroneal/saphenous/posterior tibial nerve distributions to foot/ankle.   Palpable dorsalis pedis and posterior tibialis pulses, cap refill brisk in toes, foot warm/perfused.             Vitals:    06/02/25 1407   BP: 128/87   BP Site: Right Upper Arm   Patient Position: Sitting   BP Cuff Size: Medium Adult   Pulse: (!) 101   Temp: 97.5 °F (36.4 °C)   TempSrc: Temporal   SpO2: 98%         Assessment:   Diagnosis Orders   1. S/P ankle arthrodesis  Vascular duplex lower

## 2025-06-06 DIAGNOSIS — T84.84XA PAINFUL ORTHOPAEDIC HARDWARE: ICD-10-CM

## 2025-06-06 RX ORDER — OXYCODONE AND ACETAMINOPHEN 5; 325 MG/1; MG/1
1 TABLET ORAL DAILY PRN
Qty: 7 TABLET | Refills: 0 | Status: SHIPPED | OUTPATIENT
Start: 2025-06-06 | End: 2025-06-13

## 2025-06-09 DIAGNOSIS — T84.84XA PAINFUL ORTHOPAEDIC HARDWARE: ICD-10-CM

## 2025-06-09 RX ORDER — OXYCODONE AND ACETAMINOPHEN 5; 325 MG/1; MG/1
1 TABLET ORAL DAILY PRN
Qty: 7 TABLET | Refills: 0 | Status: CANCELLED | OUTPATIENT
Start: 2025-06-09 | End: 2025-06-16

## 2025-06-09 NOTE — TELEPHONE ENCOUNTER
Received call from patient requesting refill of Percocet 5/325 mg at [facility].    Length of time from Surgery: 05/01/2025 - 6 weeks    Last OV: 06/02/2025    Medication pended and routed to providers for decision and signature.    Future Appointments   Date Time Provider Department Center   6/18/2025 10:30 AM SCHEDULE, [facility]       Electronically signed by Christiana Trejo RN on 6/9/2025 at 12:53 PM

## 2025-06-17 DIAGNOSIS — T84.84XA PAINFUL ORTHOPAEDIC HARDWARE: Primary | ICD-10-CM

## 2025-06-18 ENCOUNTER — HOSPITAL ENCOUNTER (OUTPATIENT)
Dept: GENERAL RADIOLOGY | Age: 40
Discharge: HOME | End: 2025-06-20
Payer: COMMERCIAL

## 2025-06-18 ENCOUNTER — OFFICE VISIT (OUTPATIENT)
Age: 40
End: 2025-06-18
Payer: COMMERCIAL

## 2025-06-18 VITALS — OXYGEN SATURATION: 98 % | HEART RATE: 90 BPM | SYSTOLIC BLOOD PRESSURE: 107 MMHG | DIASTOLIC BLOOD PRESSURE: 76 MMHG

## 2025-06-18 DIAGNOSIS — Z98.890 HISTORY OF REMOVAL OF RETAINED HARDWARE: ICD-10-CM

## 2025-06-18 DIAGNOSIS — Z98.1 S/P ANKLE ARTHRODESIS: ICD-10-CM

## 2025-06-18 DIAGNOSIS — T84.84XA PAINFUL ORTHOPAEDIC HARDWARE: ICD-10-CM

## 2025-06-18 PROCEDURE — 73610 X-RAY EXAM OF ANKLE: CPT

## 2025-06-18 PROCEDURE — 99024 POSTOP FOLLOW-UP VISIT: CPT

## 2025-06-18 RX ORDER — METHOCARBAMOL 750 MG/1
750 TABLET, FILM COATED ORAL 3 TIMES DAILY
Qty: 90 TABLET | Refills: 0 | Status: SHIPPED | OUTPATIENT
Start: 2025-06-18 | End: 2025-07-18

## 2025-06-18 RX ORDER — ASPIRIN 81 MG/1
81 TABLET ORAL DAILY
COMMUNITY

## 2025-06-18 RX ORDER — GABAPENTIN 300 MG/1
300 CAPSULE ORAL 3 TIMES DAILY
Qty: 270 CAPSULE | Refills: 0 | Status: SHIPPED | OUTPATIENT
Start: 2025-06-18 | End: 2025-09-16

## 2025-06-18 NOTE — PROGRESS NOTES
Chief Complaint   Patient presents with    Post-Op Check     6 wk PO CLAYTON L Ankle with tibiotalar joint fusion DOS: 05/01/2025. Pt ambulates in wheelchair with CAM boot. The patient states that she is in a lot of pain and unable to sleep due to the pain.        OP:SURGEON: Dr. Yuan Mclaughlin MD  DATE OF PROCEDURE: 5/1/2025  PROCEDURE:REMOVAL OF ORTHOPEDIC HARDWARE FROM LEFT ANKLE, TIBIOTALAR JOINT FUSION OF LEFT ANKLE, open     Subjective:  Sheri Gallegos is approximately 6 weeks from surgery. NWB L LE with CAM boot. Patient has transitioned from lovenox to aspirin. Continues to have moderate pain to left ankle with intermittent muscle spasms and burning sensation worse at night. She has been taking robaxin for muscle spasms which she reports helps. She is also taking OTC analgesics without any relief. Denies calf pain, no CP or SOB.     Review of Systems -  all pertinent positives and negatives in HPI.      Objective:    General: Alert and oriented X 3, normocephalic atraumatic, external ears and eye normal, sclera clear, no acute distress, respirations easy and unlabored with no audible wheezes, skin warm and dry, speech and dress appropriate for noted age, affect euthymic.    Extremity:  Left Lower Extremity  Skin clean dry and intact, without signs of infection  Incisions well healed  Dependent rubor noted  Compartments supple throughout thigh and leg  Calf supple and nontender  Demonstrates active knee flexion/extension, weak but active ankle plantar/dorsiflexion/great toe extension.   States sensation intact to touch in sural/deep peroneal/superficial peroneal/saphenous/posterior tibial nerve distributions to foot/ankle.   Palpable dorsalis pedis and posterior tibialis pulses, cap refill brisk in toes, foot warm/perfused.      Vitals:    06/18/25 1041   BP: 107/76   BP Site: Left Upper Arm   Patient Position: Sitting   BP Cuff Size: Medium Adult   Pulse: 90   SpO2: 98%     XR:   3 views of left ankle

## 2025-06-18 NOTE — PATIENT INSTRUCTIONS
Continue to ice, elevate, and use compression as needed    Gabapentin and robaxin sent to pharmacy    Continue non weight bearing in walking boot    Okay to remove boot for hygiene and range of motion     Ankle range of motion exercises; alphabet and towel scrunches

## 2025-07-15 DIAGNOSIS — Z98.1 S/P ANKLE ARTHRODESIS: ICD-10-CM

## 2025-07-15 DIAGNOSIS — Z98.890 HISTORY OF REMOVAL OF RETAINED HARDWARE: ICD-10-CM

## 2025-07-15 RX ORDER — METHOCARBAMOL 750 MG/1
750 TABLET, FILM COATED ORAL 3 TIMES DAILY
Qty: 90 TABLET | Refills: 0 | Status: SHIPPED | OUTPATIENT
Start: 2025-07-15 | End: 2025-08-14

## 2025-07-15 RX ORDER — GABAPENTIN 300 MG/1
300 CAPSULE ORAL 3 TIMES DAILY
Qty: 270 CAPSULE | Refills: 0 | Status: SHIPPED | OUTPATIENT
Start: 2025-07-15 | End: 2025-10-13

## 2025-07-15 NOTE — TELEPHONE ENCOUNTER
Sheri Gallegos    Gabapentin and robaxin sent to pharmacy    OARRS report reviewed    Controlled Substance Monitoring:    Acute and Chronic Pain Monitoring:   RX Monitoring Periodic Controlled Substance Monitoring   7/15/2025   1:10 PM No signs of potential drug abuse or diversion identified.        Electronically signed by Jody Bonilla PA-C on 7/15/2025 at 1:10 PM

## 2025-07-15 NOTE — TELEPHONE ENCOUNTER
Medication refill request received via pharmacy interface.    OP:SURGEON: Dr. Yuan Mclaughlin MD  DATE OF PROCEDURE: 5/1/2025  PROCEDURE:REMOVAL OF ORTHOPEDIC HARDWARE FROM LEFT ANKLE, TIBIOTALAR JOINT FUSION OF LEFT ANKLE, open     Last OV: 6/18/2025    Medication pended and routed to providers for decision and signature.    Future Appointments   Date Time Provider Department Center   8/6/2025 10:00 AM SCHEDULE, [facility]       Electronically signed by Amina Christie ATC on 7/15/25 at 12:36 PM EDT

## 2025-08-06 ENCOUNTER — OFFICE VISIT (OUTPATIENT)
Age: 40
End: 2025-08-06
Payer: COMMERCIAL

## 2025-08-06 ENCOUNTER — HOSPITAL ENCOUNTER (OUTPATIENT)
Dept: GENERAL RADIOLOGY | Age: 40
Discharge: HOME | End: 2025-08-08
Payer: COMMERCIAL

## 2025-08-06 VITALS
RESPIRATION RATE: 16 BRPM | DIASTOLIC BLOOD PRESSURE: 76 MMHG | SYSTOLIC BLOOD PRESSURE: 114 MMHG | TEMPERATURE: 98.7 F | OXYGEN SATURATION: 93 % | HEART RATE: 87 BPM

## 2025-08-06 DIAGNOSIS — Z98.1 S/P ANKLE ARTHRODESIS: ICD-10-CM

## 2025-08-06 DIAGNOSIS — Z98.1 S/P ANKLE ARTHRODESIS: Primary | ICD-10-CM

## 2025-08-06 PROCEDURE — 73610 X-RAY EXAM OF ANKLE: CPT

## 2025-08-06 PROCEDURE — 99212 OFFICE O/P EST SF 10 MIN: CPT | Performed by: PHYSICIAN ASSISTANT

## 2025-08-13 ENCOUNTER — OFFICE VISIT (OUTPATIENT)
Dept: PRIMARY CARE CLINIC | Age: 40
End: 2025-08-13

## 2025-08-13 VITALS
TEMPERATURE: 98.9 F | HEIGHT: 61 IN | SYSTOLIC BLOOD PRESSURE: 112 MMHG | HEART RATE: 82 BPM | DIASTOLIC BLOOD PRESSURE: 78 MMHG | BODY MASS INDEX: 23.6 KG/M2 | WEIGHT: 125 LBS | OXYGEN SATURATION: 95 %

## 2025-08-13 DIAGNOSIS — R05.9 COUGH IN ADULT PATIENT: ICD-10-CM

## 2025-08-13 DIAGNOSIS — J22 ACUTE LOWER RESPIRATORY INFECTION: ICD-10-CM

## 2025-08-13 LAB
INFLUENZA A ANTIGEN, POC: NEGATIVE
INFLUENZA B ANTIGEN, POC: NEGATIVE
Lab: NORMAL
PERFORMING INSTRUMENT: NORMAL
QC PASS/FAIL: NORMAL
SARS-COV-2, POC: NORMAL

## 2025-08-13 RX ORDER — DOXYCYCLINE HYCLATE 100 MG
100 TABLET ORAL 2 TIMES DAILY
Qty: 20 TABLET | Refills: 0 | Status: SHIPPED | OUTPATIENT
Start: 2025-08-13 | End: 2025-08-23

## 2025-08-13 RX ORDER — PREDNISONE 10 MG/1
10 TABLET ORAL 2 TIMES DAILY
Qty: 10 TABLET | Refills: 0 | Status: SHIPPED | OUTPATIENT
Start: 2025-08-13 | End: 2025-08-18

## 2025-08-13 RX ORDER — DEXTROMETHORPHAN HYDROBROMIDE AND PROMETHAZINE HYDROCHLORIDE 15; 6.25 MG/5ML; MG/5ML
5 SYRUP ORAL 4 TIMES DAILY PRN
Qty: 180 ML | Refills: 0 | Status: SHIPPED | OUTPATIENT
Start: 2025-08-13 | End: 2025-08-20

## 2025-08-19 ENCOUNTER — HOSPITAL ENCOUNTER (OUTPATIENT)
Dept: PHYSICAL THERAPY | Age: 40
Setting detail: THERAPIES SERIES
Discharge: HOME | End: 2025-08-19
Payer: COMMERCIAL

## 2025-08-19 DIAGNOSIS — Z98.1 S/P ANKLE ARTHRODESIS: Primary | ICD-10-CM

## 2025-08-19 PROCEDURE — 97162 PT EVAL MOD COMPLEX 30 MIN: CPT | Performed by: PHYSICAL THERAPIST

## 2025-08-20 ENCOUNTER — OFFICE VISIT (OUTPATIENT)
Dept: PRIMARY CARE CLINIC | Age: 40
End: 2025-08-20
Payer: COMMERCIAL

## 2025-08-20 VITALS
SYSTOLIC BLOOD PRESSURE: 102 MMHG | DIASTOLIC BLOOD PRESSURE: 69 MMHG | OXYGEN SATURATION: 98 % | RESPIRATION RATE: 20 BRPM | TEMPERATURE: 97.5 F | HEART RATE: 94 BPM

## 2025-08-20 DIAGNOSIS — Z20.818 EXPOSURE TO PERTUSSIS: ICD-10-CM

## 2025-08-20 DIAGNOSIS — R05.3 PERSISTENT COUGH: Primary | ICD-10-CM

## 2025-08-20 LAB
ADENOVIRUS PCR: NOT DETECTED
BORDETELLA PARAPERTUSSIS BY PCR: NOT DETECTED
BORDETELLA PERTUSSIS PCR: NOT DETECTED
CHLAMYDIA PNEUMONIAE BY PCR: NOT DETECTED
CORONAVIRUS 229E PCR: NOT DETECTED
CORONAVIRUS HKU1 PCR: NOT DETECTED
CORONAVIRUS NL63 PCR: NOT DETECTED
CORONAVIRUS OC43 PCR: NOT DETECTED
HUMAN METAPNEUMOVIRUS PCR: NOT DETECTED
INFLUENZA A BY PCR: NOT DETECTED
INFLUENZA B BY PCR: NOT DETECTED
MYCOPLASMA PNEUMONIAE PCR: NOT DETECTED
PARAINFLUENZA 1 PCR: NOT DETECTED
PARAINFLUENZA 2 PCR: NOT DETECTED
PARAINFLUENZA 3 PCR: NOT DETECTED
PARAINFLUENZA 4 PCR: NOT DETECTED
RESP SYNCYTIAL VIRUS PCR: NOT DETECTED
RHINO/ENTEROVIRUS PCR: NOT DETECTED
SARS-COV-2, PCR: NOT DETECTED
SOURCE: NORMAL

## 2025-08-20 PROCEDURE — G8427 DOCREV CUR MEDS BY ELIG CLIN: HCPCS | Performed by: NURSE PRACTITIONER

## 2025-08-20 PROCEDURE — G8420 CALC BMI NORM PARAMETERS: HCPCS | Performed by: NURSE PRACTITIONER

## 2025-08-20 PROCEDURE — 1036F TOBACCO NON-USER: CPT | Performed by: NURSE PRACTITIONER

## 2025-08-20 PROCEDURE — 99213 OFFICE O/P EST LOW 20 MIN: CPT | Performed by: NURSE PRACTITIONER

## 2025-08-20 RX ORDER — DEXTROMETHORPHAN HYDROBROMIDE AND PROMETHAZINE HYDROCHLORIDE 15; 6.25 MG/5ML; MG/5ML
5 SYRUP ORAL 4 TIMES DAILY PRN
Qty: 180 ML | Refills: 0 | Status: SHIPPED | OUTPATIENT
Start: 2025-08-20 | End: 2025-08-27

## 2025-08-20 RX ORDER — AZITHROMYCIN 250 MG/1
TABLET, FILM COATED ORAL
Qty: 6 TABLET | Refills: 0 | Status: SHIPPED | OUTPATIENT
Start: 2025-08-20 | End: 2025-08-30

## 2025-08-20 RX ORDER — ALBUTEROL SULFATE 90 UG/1
2 INHALANT RESPIRATORY (INHALATION) 4 TIMES DAILY PRN
Qty: 18 G | Refills: 0 | Status: SHIPPED | OUTPATIENT
Start: 2025-08-20

## 2025-08-22 ENCOUNTER — OFFICE VISIT (OUTPATIENT)
Dept: FAMILY MEDICINE CLINIC | Age: 40
End: 2025-08-22
Payer: COMMERCIAL

## 2025-08-22 VITALS
SYSTOLIC BLOOD PRESSURE: 114 MMHG | HEIGHT: 61 IN | DIASTOLIC BLOOD PRESSURE: 79 MMHG | WEIGHT: 135 LBS | BODY MASS INDEX: 25.49 KG/M2 | TEMPERATURE: 97.1 F | RESPIRATION RATE: 18 BRPM | HEART RATE: 101 BPM | OXYGEN SATURATION: 97 %

## 2025-08-22 DIAGNOSIS — Z98.890 HISTORY OF REMOVAL OF RETAINED HARDWARE: ICD-10-CM

## 2025-08-22 DIAGNOSIS — R05.3 PERSISTENT COUGH: Primary | ICD-10-CM

## 2025-08-22 DIAGNOSIS — Z98.1 S/P ANKLE ARTHRODESIS: Primary | ICD-10-CM

## 2025-08-22 DIAGNOSIS — J34.89 SINUS DRAINAGE: ICD-10-CM

## 2025-08-22 PROCEDURE — G8419 CALC BMI OUT NRM PARAM NOF/U: HCPCS | Performed by: STUDENT IN AN ORGANIZED HEALTH CARE EDUCATION/TRAINING PROGRAM

## 2025-08-22 PROCEDURE — 1036F TOBACCO NON-USER: CPT | Performed by: STUDENT IN AN ORGANIZED HEALTH CARE EDUCATION/TRAINING PROGRAM

## 2025-08-22 PROCEDURE — G2211 COMPLEX E/M VISIT ADD ON: HCPCS | Performed by: STUDENT IN AN ORGANIZED HEALTH CARE EDUCATION/TRAINING PROGRAM

## 2025-08-22 PROCEDURE — 99213 OFFICE O/P EST LOW 20 MIN: CPT | Performed by: STUDENT IN AN ORGANIZED HEALTH CARE EDUCATION/TRAINING PROGRAM

## 2025-08-22 PROCEDURE — G8427 DOCREV CUR MEDS BY ELIG CLIN: HCPCS | Performed by: STUDENT IN AN ORGANIZED HEALTH CARE EDUCATION/TRAINING PROGRAM

## 2025-08-22 RX ORDER — METHOCARBAMOL 750 MG/1
750 TABLET, FILM COATED ORAL 3 TIMES DAILY
Qty: 90 TABLET | Refills: 0 | Status: SHIPPED | OUTPATIENT
Start: 2025-08-22 | End: 2025-09-21

## 2025-08-22 RX ORDER — METHOCARBAMOL 750 MG/1
750 TABLET, FILM COATED ORAL 3 TIMES DAILY
COMMUNITY
End: 2025-08-22 | Stop reason: SDUPTHER

## 2025-08-25 ENCOUNTER — HOSPITAL ENCOUNTER (OUTPATIENT)
Dept: PHYSICAL THERAPY | Age: 40
Setting detail: THERAPIES SERIES
Discharge: HOME | End: 2025-08-25
Payer: COMMERCIAL

## 2025-08-25 DIAGNOSIS — Z98.1 S/P ANKLE ARTHRODESIS: Primary | ICD-10-CM

## 2025-08-25 PROCEDURE — G0283 ELEC STIM OTHER THAN WOUND: HCPCS | Performed by: PHYSICAL THERAPIST

## 2025-08-25 PROCEDURE — 97112 NEUROMUSCULAR REEDUCATION: CPT | Performed by: PHYSICAL THERAPIST

## 2025-08-27 ENCOUNTER — HOSPITAL ENCOUNTER (OUTPATIENT)
Dept: PHYSICAL THERAPY | Age: 40
Setting detail: THERAPIES SERIES
Discharge: HOME | End: 2025-08-27
Payer: COMMERCIAL

## 2025-08-28 ENCOUNTER — HOSPITAL ENCOUNTER (EMERGENCY)
Age: 40
Discharge: HOME | End: 2025-08-28
Payer: COMMERCIAL

## 2025-08-28 ENCOUNTER — APPOINTMENT (OUTPATIENT)
Dept: GENERAL RADIOLOGY | Age: 40
End: 2025-08-28
Payer: COMMERCIAL

## 2025-08-28 VITALS
RESPIRATION RATE: 18 BRPM | SYSTOLIC BLOOD PRESSURE: 120 MMHG | BODY MASS INDEX: 23.6 KG/M2 | OXYGEN SATURATION: 98 % | HEART RATE: 94 BPM | WEIGHT: 125 LBS | TEMPERATURE: 98.8 F | HEIGHT: 61 IN | DIASTOLIC BLOOD PRESSURE: 85 MMHG

## 2025-08-28 DIAGNOSIS — J40 BRONCHITIS: Primary | ICD-10-CM

## 2025-08-28 LAB
ANION GAP SERPL CALCULATED.3IONS-SCNC: 11 MMOL/L (ref 7–16)
BASOPHILS # BLD: 0.05 K/UL (ref 0–0.2)
BASOPHILS NFR BLD: 1 % (ref 0–2)
BUN SERPL-MCNC: 9 MG/DL (ref 6–20)
CALCIUM SERPL-MCNC: 9.3 MG/DL (ref 8.6–10)
CHLORIDE SERPL-SCNC: 104 MMOL/L (ref 98–107)
CO2 SERPL-SCNC: 24 MMOL/L (ref 22–29)
CREAT SERPL-MCNC: 0.5 MG/DL (ref 0.5–1)
D-DIMER QUANTITATIVE: <200 NG/ML DDU (ref 0–230)
EOSINOPHIL # BLD: 0.15 K/UL (ref 0.05–0.5)
EOSINOPHILS RELATIVE PERCENT: 2 % (ref 0–6)
ERYTHROCYTE [DISTWIDTH] IN BLOOD BY AUTOMATED COUNT: 12.6 % (ref 11.5–15)
FLUAV RNA RESP QL NAA+PROBE: NOT DETECTED
FLUBV RNA RESP QL NAA+PROBE: NOT DETECTED
GFR, ESTIMATED: >90 ML/MIN/1.73M2
GLUCOSE SERPL-MCNC: 100 MG/DL (ref 74–99)
HCT VFR BLD AUTO: 47 % (ref 34–48)
HGB BLD-MCNC: 16.8 G/DL (ref 11.5–15.5)
IMM GRANULOCYTES # BLD AUTO: 0.03 K/UL (ref 0–0.58)
IMM GRANULOCYTES NFR BLD: 0 % (ref 0–5)
LYMPHOCYTES NFR BLD: 2.43 K/UL (ref 1.5–4)
LYMPHOCYTES RELATIVE PERCENT: 35 % (ref 20–42)
MCH RBC QN AUTO: 31.6 PG (ref 26–35)
MCHC RBC AUTO-ENTMCNC: 35.7 G/DL (ref 32–34.5)
MCV RBC AUTO: 88.3 FL (ref 80–99.9)
MONOCYTES NFR BLD: 0.46 K/UL (ref 0.1–0.95)
MONOCYTES NFR BLD: 7 % (ref 2–12)
NEUTROPHILS NFR BLD: 55 % (ref 43–80)
NEUTS SEG NFR BLD: 3.83 K/UL (ref 1.8–7.3)
PLATELET # BLD AUTO: 230 K/UL (ref 130–450)
PMV BLD AUTO: 10 FL (ref 7–12)
POTASSIUM SERPL-SCNC: 4 MMOL/L (ref 3.5–5.1)
RBC # BLD AUTO: 5.32 M/UL (ref 3.5–5.5)
SARS-COV-2 RNA RESP QL NAA+PROBE: NOT DETECTED
SODIUM SERPL-SCNC: 139 MMOL/L (ref 136–145)
SOURCE: NORMAL
SPECIMEN DESCRIPTION: NORMAL
WBC OTHER # BLD: 7 K/UL (ref 4.5–11.5)

## 2025-08-28 PROCEDURE — 2580000003 HC RX 258: Performed by: NURSE PRACTITIONER

## 2025-08-28 PROCEDURE — 85025 COMPLETE CBC W/AUTO DIFF WBC: CPT

## 2025-08-28 PROCEDURE — 85379 FIBRIN DEGRADATION QUANT: CPT

## 2025-08-28 PROCEDURE — 6370000000 HC RX 637 (ALT 250 FOR IP): Performed by: NURSE PRACTITIONER

## 2025-08-28 PROCEDURE — 87636 SARSCOV2 & INF A&B AMP PRB: CPT

## 2025-08-28 PROCEDURE — 80048 BASIC METABOLIC PNL TOTAL CA: CPT

## 2025-08-28 PROCEDURE — 71046 X-RAY EXAM CHEST 2 VIEWS: CPT

## 2025-08-28 PROCEDURE — 99284 EMERGENCY DEPT VISIT MOD MDM: CPT

## 2025-08-28 RX ORDER — PREDNISONE 10 MG/1
TABLET ORAL
Qty: 30 TABLET | Refills: 0 | Status: SHIPPED | OUTPATIENT
Start: 2025-08-28 | End: 2025-09-07

## 2025-08-28 RX ORDER — CETIRIZINE HYDROCHLORIDE 10 MG/1
10 TABLET ORAL DAILY
Qty: 14 TABLET | Refills: 0 | Status: SHIPPED | OUTPATIENT
Start: 2025-08-28 | End: 2025-09-11

## 2025-08-28 RX ORDER — BENZONATATE 100 MG/1
100 CAPSULE ORAL 3 TIMES DAILY PRN
Qty: 21 CAPSULE | Refills: 0 | Status: SHIPPED | OUTPATIENT
Start: 2025-08-28 | End: 2025-09-04

## 2025-08-28 RX ORDER — GUAIFENESIN/DEXTROMETHORPHAN 100-10MG/5
10 SYRUP ORAL ONCE
Status: COMPLETED | OUTPATIENT
Start: 2025-08-28 | End: 2025-08-28

## 2025-08-28 RX ORDER — 0.9 % SODIUM CHLORIDE 0.9 %
1000 INTRAVENOUS SOLUTION INTRAVENOUS ONCE
Status: COMPLETED | OUTPATIENT
Start: 2025-08-28 | End: 2025-08-28

## 2025-08-28 RX ORDER — ALBUTEROL SULFATE 90 UG/1
2 INHALANT RESPIRATORY (INHALATION) 4 TIMES DAILY PRN
Qty: 18 G | Refills: 0 | Status: SHIPPED | OUTPATIENT
Start: 2025-08-28

## 2025-08-28 RX ADMIN — SODIUM CHLORIDE 1000 ML: 0.9 INJECTION, SOLUTION INTRAVENOUS at 14:25

## 2025-08-28 RX ADMIN — GUAIFENESIN SYRUP AND DEXTROMETHORPHAN 10 ML: 100; 10 SYRUP ORAL at 14:34

## 2025-08-28 ASSESSMENT — PAIN DESCRIPTION - LOCATION: LOCATION: THROAT

## 2025-08-28 ASSESSMENT — PAIN - FUNCTIONAL ASSESSMENT: PAIN_FUNCTIONAL_ASSESSMENT: 0-10

## 2025-08-28 ASSESSMENT — PAIN SCALES - GENERAL: PAINLEVEL_OUTOF10: 3

## 2025-08-28 ASSESSMENT — PAIN DESCRIPTION - DESCRIPTORS: DESCRIPTORS: ACHING;DISCOMFORT;DULL

## 2025-08-29 ENCOUNTER — TELEPHONE (OUTPATIENT)
Dept: FAMILY MEDICINE CLINIC | Age: 40
End: 2025-08-29

## 2025-09-02 ENCOUNTER — HOSPITAL ENCOUNTER (OUTPATIENT)
Dept: PHYSICAL THERAPY | Age: 40
Setting detail: THERAPIES SERIES
Discharge: HOME | End: 2025-09-02
Payer: COMMERCIAL

## 2025-09-02 DIAGNOSIS — R05.3 PERSISTENT COUGH: Primary | ICD-10-CM

## 2025-09-02 DIAGNOSIS — Z98.1 S/P ANKLE ARTHRODESIS: Primary | ICD-10-CM

## 2025-09-02 DIAGNOSIS — R06.2 WHEEZE: ICD-10-CM

## 2025-09-02 PROCEDURE — 97110 THERAPEUTIC EXERCISES: CPT | Performed by: PHYSICAL THERAPIST

## 2025-09-02 PROCEDURE — 97112 NEUROMUSCULAR REEDUCATION: CPT | Performed by: PHYSICAL THERAPIST

## 2025-09-02 PROCEDURE — 97016 VASOPNEUMATIC DEVICE THERAPY: CPT | Performed by: PHYSICAL THERAPIST

## 2025-09-05 ENCOUNTER — HOSPITAL ENCOUNTER (OUTPATIENT)
Dept: PHYSICAL THERAPY | Age: 40
Setting detail: THERAPIES SERIES
Discharge: HOME | End: 2025-09-05
Payer: COMMERCIAL

## 2025-09-05 DIAGNOSIS — Z98.1 S/P ANKLE ARTHRODESIS: Primary | ICD-10-CM

## 2025-09-05 PROCEDURE — 97016 VASOPNEUMATIC DEVICE THERAPY: CPT | Performed by: PHYSICAL THERAPIST

## 2025-09-05 PROCEDURE — 97112 NEUROMUSCULAR REEDUCATION: CPT | Performed by: PHYSICAL THERAPIST

## 2025-09-05 PROCEDURE — 97110 THERAPEUTIC EXERCISES: CPT | Performed by: PHYSICAL THERAPIST

## (undated) DEVICE — SYRINGE MED 10ML TRNSLUC BRL PLUNG BLK MRK POLYPR CTRL

## (undated) DEVICE — LOWER EXT KNEE DRAPE: Brand: MEDLINE INDUSTRIES, INC.

## (undated) DEVICE — ELECTRODE PT RET AD L9FT HI MOIST COND ADH HYDRGEL CORDED

## (undated) DEVICE — DRESSING,GAUZE,XEROFORM,CURAD,5"X9",ST: Brand: CURAD

## (undated) DEVICE — IMPLANTABLE DEVICE
Type: IMPLANTABLE DEVICE | Site: ANKLE | Status: NON-FUNCTIONAL
Removed: 2025-05-01

## (undated) DEVICE — ZIMMER® STERILE DISPOSABLE TOURNIQUET CUFF WITH PROTECTIVE SLEEVE AND PLC, DUAL PORT, SINGLE BLADDER, 24 IN. (61 CM)

## (undated) DEVICE — GOWN,SURGICAL,AURORA,SLEEVE: Brand: MEDLINE

## (undated) DEVICE — 4.0MM EGG

## (undated) DEVICE — ELECTRODE ES L3IN S STL BLDE INSUL DISP VALLEYLAB EDGE

## (undated) DEVICE — BANDAGE COMPR W4INXL10YD WHITE/BEIGE E MTRX HK LOOP CLSR

## (undated) DEVICE — Device

## (undated) DEVICE — 450 ML BOTTLE OF 0.05% CHLORHEXIDINE GLUCONATE IN 99.95% STERILE WATER FOR IRRIGATION, USP AND APPLICATOR.: Brand: IRRISEPT ANTIMICROBIAL WOUND LAVAGE

## (undated) DEVICE — GOWN,AURORA,BRTHSLV,2XL,18/CS: Brand: MEDLINE

## (undated) DEVICE — TUBING SUCT 12FR MAL ALUM SHFT FN CAP VENT UNIV CONN W/ OBT

## (undated) DEVICE — DRAPE EQUIP CARM 72X42 IN RUBBER BND CLP

## (undated) DEVICE — PADDING,UNDERCAST,COTTON, 4"X4YD STERILE: Brand: MEDLINE

## (undated) DEVICE — BNDG,ELSTC,MATRIX,STRL,6"X5YD,LF,HOOK&LP: Brand: MEDLINE

## (undated) DEVICE — PRECISION THIN, OFFSET (5.5 X 0.38 X 25.0MM)